# Patient Record
Sex: MALE | Race: WHITE | Employment: STUDENT | URBAN - METROPOLITAN AREA
[De-identification: names, ages, dates, MRNs, and addresses within clinical notes are randomized per-mention and may not be internally consistent; named-entity substitution may affect disease eponyms.]

---
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
## 2022-06-06 ENCOUNTER — OFFICE VISIT (OUTPATIENT)
Dept: SURGERY | Facility: CLINIC | Age: 22
End: 2022-06-06
Payer: COMMERCIAL

## 2022-06-06 VITALS
DIASTOLIC BLOOD PRESSURE: 76 MMHG | SYSTOLIC BLOOD PRESSURE: 125 MMHG | WEIGHT: 200.19 LBS | TEMPERATURE: 98 F | HEART RATE: 87 BPM | BODY MASS INDEX: 30 KG/M2

## 2022-06-06 DIAGNOSIS — M60.20 FOREIGN BODY GRANULOMA OF SOFT TISSUE: Primary | ICD-10-CM

## 2022-06-06 DIAGNOSIS — R10.31 RIGHT LOWER QUADRANT ABDOMINAL PAIN: ICD-10-CM

## 2022-06-13 DIAGNOSIS — L92.9 LIPID GRANULOMA OF SKIN CAUSED BY MINERAL OIL: Primary | ICD-10-CM

## 2022-06-13 DIAGNOSIS — T47.4X5A LIPID GRANULOMA OF SKIN CAUSED BY MINERAL OIL: Primary | ICD-10-CM

## 2022-06-13 DIAGNOSIS — R10.30 LOWER ABDOMINAL PAIN: ICD-10-CM

## 2022-06-23 ENCOUNTER — TELEPHONE (OUTPATIENT)
Facility: LOCATION | Age: 22
End: 2022-06-23

## 2022-07-01 RX ORDER — SODIUM CHLORIDE, SODIUM LACTATE, POTASSIUM CHLORIDE, CALCIUM CHLORIDE 600; 310; 30; 20 MG/100ML; MG/100ML; MG/100ML; MG/100ML
INJECTION, SOLUTION INTRAVENOUS CONTINUOUS
Status: CANCELLED | OUTPATIENT
Start: 2022-07-01

## 2022-07-01 RX ORDER — IBUPROFEN 200 MG
200 TABLET ORAL EVERY 6 HOURS PRN
COMMUNITY

## 2022-07-05 ENCOUNTER — LAB ENCOUNTER (OUTPATIENT)
Dept: LAB | Age: 22
End: 2022-07-05
Attending: COLON & RECTAL SURGERY
Payer: COMMERCIAL

## 2022-07-05 DIAGNOSIS — M60.20 FOREIGN BODY GRANULOMA OF SOFT TISSUE: ICD-10-CM

## 2022-07-06 LAB — SARS-COV-2 RNA RESP QL NAA+PROBE: NOT DETECTED

## 2022-07-08 ENCOUNTER — HOSPITAL ENCOUNTER (OUTPATIENT)
Facility: HOSPITAL | Age: 22
Setting detail: HOSPITAL OUTPATIENT SURGERY
Discharge: HOME | End: 2022-07-08
Attending: COLON & RECTAL SURGERY | Admitting: COLON & RECTAL SURGERY
Payer: COMMERCIAL

## 2022-07-08 ENCOUNTER — ANESTHESIA EVENT (OUTPATIENT)
Dept: SURGERY | Facility: HOSPITAL | Age: 22
End: 2022-07-08
Payer: COMMERCIAL

## 2022-07-08 ENCOUNTER — ANESTHESIA (OUTPATIENT)
Dept: SURGERY | Facility: HOSPITAL | Age: 22
End: 2022-07-08
Payer: COMMERCIAL

## 2022-07-08 VITALS
HEART RATE: 74 BPM | SYSTOLIC BLOOD PRESSURE: 122 MMHG | HEIGHT: 68 IN | DIASTOLIC BLOOD PRESSURE: 82 MMHG | OXYGEN SATURATION: 97 % | WEIGHT: 194 LBS | TEMPERATURE: 97 F | RESPIRATION RATE: 16 BRPM | BODY MASS INDEX: 29.4 KG/M2

## 2022-07-08 DIAGNOSIS — M60.20 FOREIGN BODY GRANULOMA OF SOFT TISSUE: Primary | ICD-10-CM

## 2022-07-08 DIAGNOSIS — R10.30 LOWER ABDOMINAL PAIN: ICD-10-CM

## 2022-07-08 DIAGNOSIS — L92.9 LIPID GRANULOMA OF SKIN CAUSED BY MINERAL OIL: ICD-10-CM

## 2022-07-08 DIAGNOSIS — T47.4X5A LIPID GRANULOMA OF SKIN CAUSED BY MINERAL OIL: ICD-10-CM

## 2022-07-08 PROCEDURE — 88304 TISSUE EXAM BY PATHOLOGIST: CPT | Performed by: COLON & RECTAL SURGERY

## 2022-07-08 PROCEDURE — 0JB80ZZ EXCISION OF ABDOMEN SUBCUTANEOUS TISSUE AND FASCIA, OPEN APPROACH: ICD-10-PCS | Performed by: COLON & RECTAL SURGERY

## 2022-07-08 RX ORDER — MIDAZOLAM HYDROCHLORIDE 1 MG/ML
1 INJECTION INTRAMUSCULAR; INTRAVENOUS EVERY 5 MIN PRN
Status: DISCONTINUED | OUTPATIENT
Start: 2022-07-08 | End: 2022-07-08

## 2022-07-08 RX ORDER — ACETAMINOPHEN 500 MG
1000 TABLET ORAL ONCE
Status: DISCONTINUED | OUTPATIENT
Start: 2022-07-08 | End: 2022-07-08 | Stop reason: HOSPADM

## 2022-07-08 RX ORDER — HYDROMORPHONE HYDROCHLORIDE 1 MG/ML
0.4 INJECTION, SOLUTION INTRAMUSCULAR; INTRAVENOUS; SUBCUTANEOUS EVERY 5 MIN PRN
Status: DISCONTINUED | OUTPATIENT
Start: 2022-07-08 | End: 2022-07-08

## 2022-07-08 RX ORDER — HYDROCODONE BITARTRATE AND ACETAMINOPHEN 5; 325 MG/1; MG/1
1 TABLET ORAL EVERY 6 HOURS PRN
Qty: 8 TABLET | Refills: 0 | Status: SHIPPED | OUTPATIENT
Start: 2022-07-08

## 2022-07-08 RX ORDER — SODIUM CHLORIDE, SODIUM LACTATE, POTASSIUM CHLORIDE, CALCIUM CHLORIDE 600; 310; 30; 20 MG/100ML; MG/100ML; MG/100ML; MG/100ML
INJECTION, SOLUTION INTRAVENOUS CONTINUOUS
Status: DISCONTINUED | OUTPATIENT
Start: 2022-07-08 | End: 2022-07-08

## 2022-07-08 RX ORDER — NALOXONE HYDROCHLORIDE 0.4 MG/ML
80 INJECTION, SOLUTION INTRAMUSCULAR; INTRAVENOUS; SUBCUTANEOUS AS NEEDED
Status: DISCONTINUED | OUTPATIENT
Start: 2022-07-08 | End: 2022-07-08

## 2022-07-08 RX ORDER — PROCHLORPERAZINE EDISYLATE 5 MG/ML
5 INJECTION INTRAMUSCULAR; INTRAVENOUS EVERY 8 HOURS PRN
Status: DISCONTINUED | OUTPATIENT
Start: 2022-07-08 | End: 2022-07-08

## 2022-07-08 RX ORDER — HYDROMORPHONE HYDROCHLORIDE 1 MG/ML
INJECTION, SOLUTION INTRAMUSCULAR; INTRAVENOUS; SUBCUTANEOUS
Status: COMPLETED
Start: 2022-07-08 | End: 2022-07-08

## 2022-07-08 RX ORDER — HYDROCODONE BITARTRATE AND ACETAMINOPHEN 5; 325 MG/1; MG/1
2 TABLET ORAL ONCE AS NEEDED
Status: COMPLETED | OUTPATIENT
Start: 2022-07-08 | End: 2022-07-08

## 2022-07-08 RX ORDER — ACETAMINOPHEN 500 MG
1000 TABLET ORAL ONCE AS NEEDED
Status: COMPLETED | OUTPATIENT
Start: 2022-07-08 | End: 2022-07-08

## 2022-07-08 RX ORDER — LIDOCAINE HYDROCHLORIDE 10 MG/ML
INJECTION, SOLUTION INFILTRATION; PERINEURAL AS NEEDED
Status: DISCONTINUED | OUTPATIENT
Start: 2022-07-08 | End: 2022-07-08 | Stop reason: HOSPADM

## 2022-07-08 RX ORDER — DIPHENHYDRAMINE HYDROCHLORIDE 50 MG/ML
12.5 INJECTION INTRAMUSCULAR; INTRAVENOUS AS NEEDED
Status: DISCONTINUED | OUTPATIENT
Start: 2022-07-08 | End: 2022-07-08

## 2022-07-08 RX ORDER — DEXAMETHASONE SODIUM PHOSPHATE 4 MG/ML
VIAL (ML) INJECTION AS NEEDED
Status: DISCONTINUED | OUTPATIENT
Start: 2022-07-08 | End: 2022-07-08 | Stop reason: SURG

## 2022-07-08 RX ORDER — GLYCOPYRROLATE 0.2 MG/ML
INJECTION, SOLUTION INTRAMUSCULAR; INTRAVENOUS AS NEEDED
Status: DISCONTINUED | OUTPATIENT
Start: 2022-07-08 | End: 2022-07-08 | Stop reason: SURG

## 2022-07-08 RX ORDER — MEPERIDINE HYDROCHLORIDE 25 MG/ML
12.5 INJECTION INTRAMUSCULAR; INTRAVENOUS; SUBCUTANEOUS AS NEEDED
Status: DISCONTINUED | OUTPATIENT
Start: 2022-07-08 | End: 2022-07-08

## 2022-07-08 RX ORDER — BUPIVACAINE HYDROCHLORIDE AND EPINEPHRINE 5; 5 MG/ML; UG/ML
INJECTION, SOLUTION EPIDURAL; INTRACAUDAL; PERINEURAL AS NEEDED
Status: DISCONTINUED | OUTPATIENT
Start: 2022-07-08 | End: 2022-07-08 | Stop reason: HOSPADM

## 2022-07-08 RX ORDER — SCOLOPAMINE TRANSDERMAL SYSTEM 1 MG/1
1 PATCH, EXTENDED RELEASE TRANSDERMAL ONCE
Status: DISCONTINUED | OUTPATIENT
Start: 2022-07-08 | End: 2022-07-08 | Stop reason: HOSPADM

## 2022-07-08 RX ORDER — HYDROMORPHONE HYDROCHLORIDE 1 MG/ML
0.2 INJECTION, SOLUTION INTRAMUSCULAR; INTRAVENOUS; SUBCUTANEOUS EVERY 5 MIN PRN
Status: DISCONTINUED | OUTPATIENT
Start: 2022-07-08 | End: 2022-07-08

## 2022-07-08 RX ORDER — NEOSTIGMINE METHYLSULFATE 1 MG/ML
INJECTION, SOLUTION INTRAVENOUS AS NEEDED
Status: DISCONTINUED | OUTPATIENT
Start: 2022-07-08 | End: 2022-07-08 | Stop reason: SURG

## 2022-07-08 RX ORDER — LIDOCAINE HYDROCHLORIDE 40 MG/ML
INJECTION, SOLUTION RETROBULBAR; TOPICAL AS NEEDED
Status: DISCONTINUED | OUTPATIENT
Start: 2022-07-08 | End: 2022-07-08 | Stop reason: SURG

## 2022-07-08 RX ORDER — LIDOCAINE HYDROCHLORIDE 10 MG/ML
INJECTION, SOLUTION EPIDURAL; INFILTRATION; INTRACAUDAL; PERINEURAL AS NEEDED
Status: DISCONTINUED | OUTPATIENT
Start: 2022-07-08 | End: 2022-07-08 | Stop reason: SURG

## 2022-07-08 RX ORDER — HYDROMORPHONE HYDROCHLORIDE 1 MG/ML
0.6 INJECTION, SOLUTION INTRAMUSCULAR; INTRAVENOUS; SUBCUTANEOUS EVERY 5 MIN PRN
Status: DISCONTINUED | OUTPATIENT
Start: 2022-07-08 | End: 2022-07-08

## 2022-07-08 RX ORDER — HYDROCODONE BITARTRATE AND ACETAMINOPHEN 5; 325 MG/1; MG/1
1 TABLET ORAL ONCE AS NEEDED
Status: COMPLETED | OUTPATIENT
Start: 2022-07-08 | End: 2022-07-08

## 2022-07-08 RX ORDER — HEPARIN SODIUM 5000 [USP'U]/ML
5000 INJECTION, SOLUTION INTRAVENOUS; SUBCUTANEOUS ONCE
Status: COMPLETED | OUTPATIENT
Start: 2022-07-08 | End: 2022-07-08

## 2022-07-08 RX ORDER — KETOROLAC TROMETHAMINE 30 MG/ML
INJECTION, SOLUTION INTRAMUSCULAR; INTRAVENOUS AS NEEDED
Status: DISCONTINUED | OUTPATIENT
Start: 2022-07-08 | End: 2022-07-08 | Stop reason: SURG

## 2022-07-08 RX ORDER — ROCURONIUM BROMIDE 10 MG/ML
INJECTION, SOLUTION INTRAVENOUS AS NEEDED
Status: DISCONTINUED | OUTPATIENT
Start: 2022-07-08 | End: 2022-07-08 | Stop reason: SURG

## 2022-07-08 RX ORDER — CEFAZOLIN SODIUM/WATER 2 G/20 ML
2 SYRINGE (ML) INTRAVENOUS ONCE
Status: COMPLETED | OUTPATIENT
Start: 2022-07-08 | End: 2022-07-08

## 2022-07-08 RX ORDER — KETAMINE HYDROCHLORIDE 50 MG/ML
INJECTION, SOLUTION, CONCENTRATE INTRAMUSCULAR; INTRAVENOUS AS NEEDED
Status: DISCONTINUED | OUTPATIENT
Start: 2022-07-08 | End: 2022-07-08 | Stop reason: SURG

## 2022-07-08 RX ORDER — ONDANSETRON 2 MG/ML
4 INJECTION INTRAMUSCULAR; INTRAVENOUS EVERY 6 HOURS PRN
Status: DISCONTINUED | OUTPATIENT
Start: 2022-07-08 | End: 2022-07-08

## 2022-07-08 RX ORDER — ONDANSETRON 2 MG/ML
INJECTION INTRAMUSCULAR; INTRAVENOUS AS NEEDED
Status: DISCONTINUED | OUTPATIENT
Start: 2022-07-08 | End: 2022-07-08 | Stop reason: SURG

## 2022-07-08 RX ADMIN — ONDANSETRON 4 MG: 2 INJECTION INTRAMUSCULAR; INTRAVENOUS at 11:32:00

## 2022-07-08 RX ADMIN — ROCURONIUM BROMIDE 30 MG: 10 INJECTION, SOLUTION INTRAVENOUS at 11:23:00

## 2022-07-08 RX ADMIN — LIDOCAINE HYDROCHLORIDE 4 ML: 40 INJECTION, SOLUTION RETROBULBAR; TOPICAL at 11:25:00

## 2022-07-08 RX ADMIN — KETOROLAC TROMETHAMINE 30 MG: 30 INJECTION, SOLUTION INTRAMUSCULAR; INTRAVENOUS at 12:06:00

## 2022-07-08 RX ADMIN — SODIUM CHLORIDE, SODIUM LACTATE, POTASSIUM CHLORIDE, CALCIUM CHLORIDE: 600; 310; 30; 20 INJECTION, SOLUTION INTRAVENOUS at 12:09:00

## 2022-07-08 RX ADMIN — DEXAMETHASONE SODIUM PHOSPHATE 8 MG: 4 MG/ML VIAL (ML) INJECTION at 11:27:00

## 2022-07-08 RX ADMIN — LIDOCAINE HYDROCHLORIDE 100 MG: 10 INJECTION, SOLUTION EPIDURAL; INFILTRATION; INTRACAUDAL; PERINEURAL at 11:23:00

## 2022-07-08 RX ADMIN — CEFAZOLIN SODIUM/WATER 2 G: 2 G/20 ML SYRINGE (ML) INTRAVENOUS at 11:28:00

## 2022-07-08 RX ADMIN — NEOSTIGMINE METHYLSULFATE 4 MG: 1 INJECTION, SOLUTION INTRAVENOUS at 12:06:00

## 2022-07-08 RX ADMIN — KETAMINE HYDROCHLORIDE 10 MG: 50 INJECTION, SOLUTION, CONCENTRATE INTRAMUSCULAR; INTRAVENOUS at 11:28:00

## 2022-07-08 RX ADMIN — GLYCOPYRROLATE 0.8 MG: 0.2 INJECTION, SOLUTION INTRAMUSCULAR; INTRAVENOUS at 12:06:00

## 2022-07-08 RX ADMIN — SODIUM CHLORIDE, SODIUM LACTATE, POTASSIUM CHLORIDE, CALCIUM CHLORIDE: 600; 310; 30; 20 INJECTION, SOLUTION INTRAVENOUS at 11:17:00

## 2022-07-08 NOTE — ANESTHESIA PROCEDURE NOTES
Airway  Date/Time: 7/8/2022 11:26 AM  Urgency: elective      General Information and Staff    Patient location during procedure: OR  Anesthesiologist: Nelson Beal MD  Resident/CRNA: Satya Dean CRNA  Performed: CRNA     Indications and Patient Condition  Indications for airway management: anesthesia  Sedation level: deep  Preoxygenated: yes  Patient position: sniffing  Mask difficulty assessment: 1 - vent by mask    Final Airway Details  Final airway type: endotracheal airway      Successful airway: ETT  Cuffed: yes   Successful intubation technique: direct laryngoscopy  Endotracheal tube insertion site: oral  Blade: Nguyễn  Blade size: #4  ETT size (mm): 7.5    Cormack-Lehane Classification: grade I - full view of glottis  Placement verified by: chest auscultation and capnometry   Measured from: lips  ETT to lips (cm): 23  Number of attempts at approach: 1

## 2022-07-08 NOTE — OPERATIVE REPORT
[facility]  Operative Note    [facility] Location: OR   [facility] [id number] MRN [id number]   Admission Date 7/8/2022 Operation Date 7/8/2022   Attending Physician Lavinia Dalton MD Operating Physician Liang Abbasi MD     Pre-Operative Diagnosis: foreign body granuloma of lower abdomen    Post-Operative Diagnosis: Same as above    Procedure Performed: Excision of foreign body granuloma right lower quadrant just to the right of the midline, full-thickness down to fascia    Surgeon(s) and Role:     Gurvinder Blackwell MD - Primary  Assistant: Tejinder Boyd PA-C    Anesthesia: General    History of Present Illness: This patient has had a hernia repair. We already have remove one tack from his abdominal wall. 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 There is a point of tenderness that is constantly bothering him. The patient and I preoperatively marked the site. He presents at this time for removal of foreign body granuloma. Operative findings:      Foreign body granuloma actually was a folded piece of mesh that was protruding up into the rectus muscle. I was able to identify it immediately beneath the clark that was placed by me in the patient. We cut off that corner of the fold in the mesh. I repaired the defect in 2 layers with a deep layer of #1 Vicryl, superficial layer of running #1 Ethibond suture. Operative Summary:    Following adequate general anesthesia the patient was placed in the supine position on the operating room table. The abdomen was scrubbed with chlorhexidine, sterile drapes were placed with wide exposure of the abdomen from xiphoid to pubis. The patient and I have preoperatively marked the site of his symptoms. Timeout was performed confirming the patient's identifying data, the administration of antibiotics, and the procedure to be performed. I made an incision directly over the site in a transverse fashion.   I was able to palpate through the skin the area of the granuloma. Skin and subcutaneous tissues were dissected down to the anterior fascia. Immediately beneath the anterior fascia, within the rectus muscle, a folded piece of mesh was identified. It was removed with surrounding tissue that was chronically involved with granulomatous tissue. It was sent to pathology for further histopathologic diagnosis. I then closed the defect with a deep layer of #1 Vicryl to the posterior rectus fascia. Anterior fascia was repaired with a running #1 Ethibond suture in a simple continuous fashion. The total defect was approximately 2 cm. The skin was then closed in layers with a deep layer of interrupted 3-0 Vicryl. The top layer was 5-0 undyed Vicryl in a subcuticular fashion. Marcaine 0.5% with epinephrine was injected into the wound margins. Steri-Strips were placed over benzoin adhesive. Sterile dressings were placed. The patient was delivered to recovery in stable postoperative condition.             EBL: 3 cc    Pathologic Specimen: Foreign body granuloma within the muscle and fascia of the right lower quadrant just lateral to the midline    Drains: None    Marquita Mattson MD  7/8/2022  1:00 PM

## 2022-07-08 NOTE — ANESTHESIA POSTPROCEDURE EVALUATION
[address] Patient Status:  Hospital Outpatient Surgery   Age/Gender 20year old male MRN [id number]   Location [address] [facility] Attending Regla Rodrigues MD   Hosp Day # 0 PCP No primary care provider on file. Anesthesia Post-op Note    EXCISION OF FOREIGN BODY GRANULOMA MIDLINE ABDOMEN    Procedure Summary     Date: 07/08/22 Room / Location: [facility] MAIN OR [address] [place] [address] MAIN OR    Anesthesia Start: 4579 Anesthesia Stop: 1207    Procedure: EXCISION OF FOREIGN BODY GRANULOMA MIDLINE ABDOMEN (N/A Abdomen) Diagnosis:       Lipid granuloma of skin caused by mineral oil      Lower abdominal pain      (same as pre-op)    Surgeons: Regla Rodrigues MD Anesthesiologist: Casper Amaya MD    Anesthesia Type: general ASA Status: 1          Anesthesia Type: general    Vitals Value Taken Time   /77 07/08/22 1229   Temp 97.9 07/08/22 1231   Pulse 61 07/08/22 1230   Resp 20 07/08/22 1230   SpO2 98 % 07/08/22 1230   Vitals shown include unvalidated device data. Patient Location: PACU    Anesthesia Type: general    Airway Patency: patent    Postop Pain Control: adequate    Mental Status: mildly sedated but able to meaningfully participate in the post-anesthesia evaluation    Nausea/Vomiting: none    Cardiopulmonary/Hydration status: stable euvolemic    Complications: no apparent anesthesia related complications    Postop vital signs: stable    Dental Exam: Unchanged from Preop    Patient to be discharged from PACU when criteria met.

## 2022-07-11 ENCOUNTER — OFFICE VISIT (OUTPATIENT)
Facility: LOCATION | Age: 22
End: 2022-07-11

## 2022-07-11 ENCOUNTER — TELEPHONE (OUTPATIENT)
Facility: LOCATION | Age: 22
End: 2022-07-11

## 2022-07-11 VITALS — SYSTOLIC BLOOD PRESSURE: 122 MMHG | TEMPERATURE: 97 F | DIASTOLIC BLOOD PRESSURE: 82 MMHG | HEART RATE: 74 BPM

## 2022-07-11 DIAGNOSIS — Z98.890 POST-OPERATIVE STATE: Primary | ICD-10-CM

## 2022-07-18 ENCOUNTER — OFFICE VISIT (OUTPATIENT)
Facility: LOCATION | Age: 22
End: 2022-07-18

## 2022-07-18 VITALS — BODY MASS INDEX: 29.4 KG/M2 | WEIGHT: 194 LBS | TEMPERATURE: 97 F | HEIGHT: 68 IN

## 2022-07-18 DIAGNOSIS — Z98.890 POST-OPERATIVE STATE: Primary | ICD-10-CM

## 2022-07-18 DIAGNOSIS — M60.20 FOREIGN BODY GRANULOMA OF SOFT TISSUE: ICD-10-CM

## 2022-07-18 NOTE — PATIENT INSTRUCTIONS
The patient presents for continued care and evaluation following an excision of a suture granuloma with layered closure on 7/8/2022. The patient states he has been doing well postoperatively. He states he has minimal pain at the incision site. He initially saw Lisa Carrion PA-C on 7/11/2022 due to fear of a possible postop infection. She did not see evidence of a wound infection at that time. The patient denies any drainage from the incision site. He denies surrounding erythema or cellulitis. He denies fevers, chills, nausea or vomiting. Clinical exam of the patient's wound reveals an approximately 5 cm transverse incision in the right lower quadrant. I took the opportunity at today's visit to remove his Steri-Strips. The incision is clean, dry, intact without surrounding erythema or cellulitis. Overall the patient is doing well following excision of a suture granuloma with layered closure on 7/8/2022. I instructed the patient to place Neosporin over the wound 3 times daily to aid in wound healing. He should refrain from submerging his incision in a pool, bathtub or hot tub for a total of 2 weeks postoperatively. I instructed the patient to refrain from lifting, twisting, pulling, pushing items greater than 25 pounds for a total of 6 weeks postoperatively. I have no further follow-up scheduled with this patient at this time. This patient can see me or Dr. Maude Hopson on an as-needed basis. This patient should return urgently for any problems or complications related to the surgical intervention.

## 2023-01-02 ENCOUNTER — TELEPHONE (OUTPATIENT)
Facility: LOCATION | Age: 23
End: 2023-01-02

## 2023-01-02 NOTE — TELEPHONE ENCOUNTER
Called pt to schedule appt with Dr. Taya Lucia. Offered appts and pt is unable to make d/t living in [place]. Advised he see his dr in [place].   Pt verbalized understanding

## 2023-01-02 NOTE — TELEPHONE ENCOUNTER
Pt called because he is experiencing 4/10 pain near belt-line, a little above the last incision he had. Denies fever and bleeding.  Will make an appt to be seen by the dr as well  Please advise  Best callback number is [phone]

## 2023-02-09 ENCOUNTER — APPOINTMENT (OUTPATIENT)
Dept: CT IMAGING | Facility: CLINIC | Age: 23
End: 2023-02-09
Attending: EMERGENCY MEDICINE
Payer: COMMERCIAL

## 2023-02-09 ENCOUNTER — HOSPITAL ENCOUNTER (EMERGENCY)
Facility: CLINIC | Age: 23
Discharge: HOME | End: 2023-02-09
Attending: EMERGENCY MEDICINE | Admitting: EMERGENCY MEDICINE
Payer: COMMERCIAL

## 2023-02-09 VITALS
OXYGEN SATURATION: 97 % | HEART RATE: 65 BPM | RESPIRATION RATE: 16 BRPM | WEIGHT: 200 LBS | SYSTOLIC BLOOD PRESSURE: 123 MMHG | TEMPERATURE: 98.3 F | DIASTOLIC BLOOD PRESSURE: 65 MMHG

## 2023-02-09 DIAGNOSIS — R10.30 LOWER ABDOMINAL PAIN: ICD-10-CM

## 2023-02-09 DIAGNOSIS — R17 ELEVATED BILIRUBIN: ICD-10-CM

## 2023-02-09 DIAGNOSIS — R30.0 DYSURIA: ICD-10-CM

## 2023-02-09 LAB
ALBUMIN SERPL-MCNC: 4.6 G/DL (ref 3.5–5)
ALBUMIN UR-MCNC: 30 MG/DL
ALP SERPL-CCNC: 50 U/L (ref 45–120)
ALT SERPL W P-5'-P-CCNC: 24 U/L (ref 0–45)
ANION GAP SERPL CALCULATED.3IONS-SCNC: 11 MMOL/L (ref 5–18)
APPEARANCE UR: CLEAR
AST SERPL W P-5'-P-CCNC: 31 U/L (ref 0–40)
BASOPHILS # BLD AUTO: 0 10E3/UL (ref 0–0.2)
BASOPHILS NFR BLD AUTO: 0 %
BILIRUB DIRECT SERPL-MCNC: 0.4 MG/DL
BILIRUB SERPL-MCNC: 4.5 MG/DL (ref 0–1)
BILIRUB UR QL STRIP: NEGATIVE
BUN SERPL-MCNC: 11 MG/DL (ref 8–22)
CALCIUM SERPL-MCNC: 9.1 MG/DL (ref 8.5–10.5)
CHLORIDE BLD-SCNC: 103 MMOL/L (ref 98–107)
CO2 SERPL-SCNC: 22 MMOL/L (ref 22–31)
COLOR UR AUTO: YELLOW
CREAT SERPL-MCNC: 0.85 MG/DL (ref 0.7–1.3)
EOSINOPHIL # BLD AUTO: 0.2 10E3/UL (ref 0–0.7)
EOSINOPHIL NFR BLD AUTO: 2 %
ERYTHROCYTE [DISTWIDTH] IN BLOOD BY AUTOMATED COUNT: 12.3 % (ref 10–15)
GFR SERPL CREATININE-BSD FRML MDRD: >90 ML/MIN/1.73M2
GLUCOSE BLD-MCNC: 122 MG/DL (ref 70–125)
GLUCOSE UR STRIP-MCNC: NEGATIVE MG/DL
HCT VFR BLD AUTO: 42.6 % (ref 40–53)
HGB BLD-MCNC: 14.9 G/DL (ref 13.3–17.7)
HGB UR QL STRIP: NEGATIVE
HYALINE CASTS: 1 /LPF
IMM GRANULOCYTES # BLD: 0 10E3/UL
IMM GRANULOCYTES NFR BLD: 0 %
KETONES UR STRIP-MCNC: 60 MG/DL
LEUKOCYTE ESTERASE UR QL STRIP: NEGATIVE
LYMPHOCYTES # BLD AUTO: 2.3 10E3/UL (ref 0.8–5.3)
LYMPHOCYTES NFR BLD AUTO: 25 %
MCH RBC QN AUTO: 29.2 PG (ref 26.5–33)
MCHC RBC AUTO-ENTMCNC: 35 G/DL (ref 31.5–36.5)
MCV RBC AUTO: 84 FL (ref 78–100)
MONOCYTES # BLD AUTO: 0.8 10E3/UL (ref 0–1.3)
MONOCYTES NFR BLD AUTO: 9 %
MUCOUS THREADS #/AREA URNS LPF: PRESENT /LPF
NEUTROPHILS # BLD AUTO: 5.9 10E3/UL (ref 1.6–8.3)
NEUTROPHILS NFR BLD AUTO: 64 %
NITRATE UR QL: NEGATIVE
NRBC # BLD AUTO: 0 10E3/UL
NRBC BLD AUTO-RTO: 0 /100
PH UR STRIP: 5.5 [PH] (ref 5–7)
PLATELET # BLD AUTO: 238 10E3/UL (ref 150–450)
POTASSIUM BLD-SCNC: 3.4 MMOL/L (ref 3.5–5)
PROT SERPL-MCNC: 7.2 G/DL (ref 6–8)
RBC # BLD AUTO: 5.1 10E6/UL (ref 4.4–5.9)
RBC URINE: 0 /HPF
SODIUM SERPL-SCNC: 136 MMOL/L (ref 136–145)
SP GR UR STRIP: 1.03 (ref 1–1.03)
UROBILINOGEN UR STRIP-MCNC: <2 MG/DL
WBC # BLD AUTO: 9.2 10E3/UL (ref 4–11)
WBC URINE: 1 /HPF

## 2023-02-09 PROCEDURE — 80053 COMPREHEN METABOLIC PANEL: CPT | Performed by: EMERGENCY MEDICINE

## 2023-02-09 PROCEDURE — 99285 EMERGENCY DEPT VISIT HI MDM: CPT | Mod: 25

## 2023-02-09 PROCEDURE — 87591 N.GONORRHOEAE DNA AMP PROB: CPT | Performed by: EMERGENCY MEDICINE

## 2023-02-09 PROCEDURE — 81003 URINALYSIS AUTO W/O SCOPE: CPT | Performed by: EMERGENCY MEDICINE

## 2023-02-09 PROCEDURE — 87491 CHLMYD TRACH DNA AMP PROBE: CPT | Performed by: EMERGENCY MEDICINE

## 2023-02-09 PROCEDURE — 74177 CT ABD & PELVIS W/CONTRAST: CPT

## 2023-02-09 PROCEDURE — 85025 COMPLETE CBC W/AUTO DIFF WBC: CPT | Performed by: EMERGENCY MEDICINE

## 2023-02-09 PROCEDURE — 250N000011 HC RX IP 250 OP 636: Performed by: EMERGENCY MEDICINE

## 2023-02-09 PROCEDURE — 36415 COLL VENOUS BLD VENIPUNCTURE: CPT | Performed by: EMERGENCY MEDICINE

## 2023-02-09 PROCEDURE — 82248 BILIRUBIN DIRECT: CPT | Performed by: EMERGENCY MEDICINE

## 2023-02-09 RX ORDER — CEPHALEXIN 500 MG/1
500 CAPSULE ORAL 2 TIMES DAILY
Qty: 28 CAPSULE | Refills: 0 | Status: SHIPPED | OUTPATIENT
Start: 2023-02-09 | End: 2023-02-16

## 2023-02-09 RX ORDER — IOPAMIDOL 755 MG/ML
100 INJECTION, SOLUTION INTRAVASCULAR ONCE
Status: COMPLETED | OUTPATIENT
Start: 2023-02-09 | End: 2023-02-09

## 2023-02-09 RX ADMIN — IOPAMIDOL 100 ML: 755 INJECTION, SOLUTION INTRAVENOUS at 20:00

## 2023-02-10 ENCOUNTER — OFFICE VISIT (OUTPATIENT)
Dept: FAMILY MEDICINE | Facility: CLINIC | Age: 23
End: 2023-02-10
Attending: EMERGENCY MEDICINE
Payer: COMMERCIAL

## 2023-02-10 VITALS
DIASTOLIC BLOOD PRESSURE: 86 MMHG | HEART RATE: 68 BPM | WEIGHT: 195.44 LBS | SYSTOLIC BLOOD PRESSURE: 112 MMHG | OXYGEN SATURATION: 97 %

## 2023-02-10 DIAGNOSIS — Z76.89 ENCOUNTER TO ESTABLISH CARE: Primary | ICD-10-CM

## 2023-02-10 DIAGNOSIS — R17 ELEVATED BILIRUBIN: ICD-10-CM

## 2023-02-10 LAB
C TRACH DNA SPEC QL NAA+PROBE: NEGATIVE
N GONORRHOEA DNA SPEC QL NAA+PROBE: NEGATIVE

## 2023-02-10 PROCEDURE — 99204 OFFICE O/P NEW MOD 45 MIN: CPT | Performed by: PHYSICIAN ASSISTANT

## 2023-02-10 NOTE — DISCHARGE INSTRUCTIONS
You need to follow up with a Primary Care for repeat blood testing to ensure your bilirubin returns to normal.

## 2023-02-10 NOTE — ED TRIAGE NOTES
Pt has a history of hernia repair and mesh revisions.  Has pain right lower quad.  But urine is also cloudy and burns when urinating.     Triage Assessment     Row Name 02/09/23 1268       Triage Assessment (Adult)    Airway WDL WDL       Respiratory WDL    Respiratory WDL WDL       Skin Circulation/Temperature WDL    Skin Circulation/Temperature WDL WDL       Cardiac WDL    Cardiac WDL WDL       Peripheral/Neurovascular WDL    Peripheral Neurovascular WDL WDL       Cognitive/Neuro/Behavioral WDL    Cognitive/Neuro/Behavioral WDL WDL

## 2023-02-10 NOTE — ED PROVIDER NOTES
EMERGENCY DEPARTMENT ENCOUNTER      NAME: Graeme Christian  AGE: 22 year old male  YOB: 2000  MRN: [id number]  EVALUATION DATE & TIME: No admission date for patient encounter.    PCP: No primary care provider on file.    ED PROVIDER: Robbie Ronquillo D.O.      Chief Complaint   Patient presents with     Abdominal Pain     Dysuria       FINAL IMPRESSION:  1. Dysuria    2. Elevated bilirubin    3. Lower abdominal pain        ED COURSE & MEDICAL DECISION MAKIN:00 PM I met with the patient to gather history and to perform my initial exam. I discussed the plan for care while in the Emergency Department.  8:39 PM I updated patient with results and plan for care.  8:44 PM I spoke to Dr Mcnally with [facility] regarding patient. They recommend follow up with PCP.  8:53 PM I updated patient with plan for discharge.         Pertinent Labs & Imaging studies reviewed. (See chart for details)  22 year old male presents to the Emergency Department for evaluation of lower abdominal pain and dysuria.  Initial concern for UTI versus STI versus appendicitis versus bowel obstruction versus pyelonephritis.  UA was largely unremarkable, however with his symptoms, and his insistence that he has no significant risk factors for STIs as he always uses protection, uncertain the underlying cause of the dysuria, but do plan is still discharged on antibiotics.  CT scan imaging does not show any evidence of hernia, obstruction, appendicitis, or other inflammatory process of the abdomen.  Patient has no scrotal or testicular pain, therefore I did not believe ultrasound was indicated of the scrotum.  Of note on his labs he did have an elevated bilirubin but otherwise was largely unremarkable.  Because of this elevated bilirubin, I did consult with gastroenterology, and their recommendation was follow-up with primary care as this likely represents a benign process.  I did not feel that he needed to be admitted or needed to be seen by them  in the clinic.  Patient was agreeable with this plan.  Return precautions were discussed.    Medical Decision Making    History:    Supplemental history from: Documented in chart, if applicable    External Record(s) reviewed: Documented in chart, if applicable.    Work Up:    Chart documentation includes differential considered and any EKGs or imaging independently interpreted by provider, where specified.    In additional to work up documented, I considered the following work up: Documented in chart, if applicable.    External consultation:    Discussion of management with another provider: Documented in chart, if applicable    Complicating factors:    Care impacted by chronic illness: N/A    Care affected by social determinants of health: N/A    Disposition considerations: Discharge. I prescribed additional prescription strength medication(s) as charted. N/A.        At the conclusion of the encounter I discussed the results of all of the tests and the disposition. The questions were answered. The patient or family acknowledged understanding and was agreeable with the care plan.      HPI    Patient information was obtained from: Patient    Use of : N/A       Graeme Christian is a 22 year old male who presents via walk-in for evaluation of dysuria, abdominal pain.    Patient endorses burning pain with urination and cloudy urine, as well as bilateral groin pain which is ongoing without pain in his scrotum. He also endorses non radiating RLQ abdominal pain which he feels may be due to prior hernia issues at that location, noting that he has had 4 previous surgeries for this issue and has a mesh implanted. Patient still has his appendix. He reports that he is sexually active but uses protection.    Patient denies nausea, vomiting, diarrhea, penile discharge, fever, cough, congestion, sore throat, and all other complaints at this time.      REVIEW OF SYSTEMS  Constitutional:  Denies fever, chills, weight loss or  weakness  Eyes:  No pain, discharge, redness  HENT:  Denies sore throat, ear pain, congestion  Respiratory: No SOB, wheeze or cough  Cardiovascular:  No CP, palpitations  GI:  Denies nausea, vomiting, diarrhea. Positive for RLQ abdominal pain.  : Denies hematuria. Positive for dysuria, bilateral groin pain.  Musculoskeletal:  Denies any new muscle/joint pain, swelling or loss of function.  Skin:  Denies rash, pallor  Neurologic:  Denies headache, focal weakness or sensory changes  Lymph: Denies swollen nodes    All other systems negative unless noted in HPI.    PAST MEDICAL HISTORY:  History reviewed. No pertinent past medical history.    PAST SURGICAL HISTORY:  History reviewed. No pertinent surgical history.      CURRENT MEDICATIONS:    No current facility-administered medications for this encounter.     Current Outpatient Medications   Medication     cephALEXin (KEFLEX) 500 MG capsule         ALLERGIES:  No Known Allergies    FAMILY HISTORY:  History reviewed. No pertinent family history.    SOCIAL HISTORY:       VITALS:  Patient Vitals for the past 24 hrs:   BP Temp Temp src Pulse Resp SpO2 Weight   02/09/23 2046 123/65 -- -- 65 -- 97 % --   02/09/23 1854 (!) 163/86 98.3  F (36.8  C) Oral 104 16 100 % 90.7 kg (200 lb)       PHYSICAL EXAM    VITAL SIGNS: /65   Pulse 65   Temp 98.3  F (36.8  C) (Oral)   Resp 16   Wt 90.7 kg (200 lb)   SpO2 97%     General Appearance: Well-appearing, well-nourished, no acute distress  Head:  Normocephalic, without obvious abnormality, atraumatic  Eyes:  PERRL, conjunctiva/corneas clear, EOM's intact,  ENT:  Lips, mucosa, and tongue normal, membranes are moist without pallor  Neck:  Normal ROM, symmetrical, trachea midline    Cardio:  Regular rate and rhythm, no murmur, rub or gallop, 2+ pulses symmetric in all extremities  Pulm:  Clear to auscultation bilaterally, respirations unlabored,  Abdomen:  Soft, no rebound or guarding. RLQ tenderness.  Musculoskeletal: Full  ROM, no edema, no cyanosis, good ROM of major joints  Integument:  Warm, Dry, No erythema, No rash.    Neurologic:  Alert & oriented.  No focal deficits appreciated.  Ambulatory.  Psychiatric:  Affect normal, Judgment normal, Mood normal.      LABS  Results for orders placed or performed during the hospital encounter of 02/09/23 (from the past 24 hour(s))   CBC with platelets + differential    Narrative    The following orders were created for panel order CBC with platelets + differential.  Procedure                               Abnormality         Status                     ---------                               -----------         ------                     CBC with platelets and d...[[id number]]                      Final result                 Please view results for these tests on the individual orders.   Basic metabolic panel   Result Value Ref Range    Sodium 136 136 - 145 mmol/L    Potassium 3.4 (L) 3.5 - 5.0 mmol/L    Chloride 103 98 - 107 mmol/L    Carbon Dioxide (CO2) 22 22 - 31 mmol/L    Anion Gap 11 5 - 18 mmol/L    Urea Nitrogen 11 8 - 22 mg/dL    Creatinine 0.85 0.70 - 1.30 mg/dL    Calcium 9.1 8.5 - 10.5 mg/dL    Glucose 122 70 - 125 mg/dL    GFR Estimate >90 >60 mL/min/1.73m2   Hepatic function panel   Result Value Ref Range    Bilirubin Total 4.5 (H) 0.0 - 1.0 mg/dL    Bilirubin Direct 0.4 <=0.5 mg/dL    Protein Total 7.2 6.0 - 8.0 g/dL    Albumin 4.6 3.5 - 5.0 g/dL    Alkaline Phosphatase 50 45 - 120 U/L    AST 31 0 - 40 U/L    ALT 24 0 - 45 U/L   CBC with platelets and differential   Result Value Ref Range    WBC Count 9.2 4.0 - 11.0 10e3/uL    RBC Count 5.10 4.40 - 5.90 10e6/uL    Hemoglobin 14.9 13.3 - 17.7 g/dL    Hematocrit 42.6 40.0 - 53.0 %    MCV 84 78 - 100 fL    MCH 29.2 26.5 - 33.0 pg    MCHC 35.0 31.5 - 36.5 g/dL    RDW 12.3 10.0 - 15.0 %    Platelet Count 238 150 - 450 10e3/uL    % Neutrophils 64 %    % Lymphocytes 25 %    % Monocytes 9 %    % Eosinophils 2 %    % Basophils 0 %    %  Immature Granulocytes 0 %    NRBCs per 100 WBC 0 <1 /100    Absolute Neutrophils 5.9 1.6 - 8.3 10e3/uL    Absolute Lymphocytes 2.3 0.8 - 5.3 10e3/uL    Absolute Monocytes 0.8 0.0 - 1.3 10e3/uL    Absolute Eosinophils 0.2 0.0 - 0.7 10e3/uL    Absolute Basophils 0.0 0.0 - 0.2 10e3/uL    Absolute Immature Granulocytes 0.0 <=0.4 10e3/uL    Absolute NRBCs 0.0 10e3/uL   UA with Microscopic reflex to Culture    Specimen: Urine, Midstream   Result Value Ref Range    Color Urine Yellow Colorless, Straw, Light Yellow, Yellow    Appearance Urine Clear Clear    Glucose Urine Negative Negative mg/dL    Bilirubin Urine Negative Negative    Ketones Urine 60 (A) Negative mg/dL    Specific Gravity Urine 1.034 (H) 1.001 - 1.030    Blood Urine Negative Negative    pH Urine 5.5 5.0 - 7.0    Protein Albumin Urine 30 (A) Negative mg/dL    Urobilinogen Urine <2.0 <2.0 mg/dL    Nitrite Urine Negative Negative    Leukocyte Esterase Urine Negative Negative    Mucus Urine Present (A) None Seen /LPF    RBC Urine 0 <=2 /HPF    WBC Urine 1 <=5 /HPF    Hyaline Casts Urine 1 <=2 /LPF    Narrative    Urine Culture not indicated   CT Abdomen Pelvis w Contrast    Narrative    EXAM: CT ABDOMEN PELVIS W CONTRAST  LOCATION: [facility]  DATE/TIME: 2/9/2023 8:10 PM    INDICATION: RLQ abdominal pain.  COMPARISON: None.  TECHNIQUE: CT scan of the abdomen and pelvis was performed following injection of IV contrast. Multiplanar reformats were obtained. Dose reduction techniques were used.  CONTRAST: ISOVUE 370 100 mL.    FINDINGS:   LOWER CHEST: Normal.    HEPATOBILIARY: Normal.    PANCREAS: Normal.    SPLEEN: Normal.    ADRENAL GLANDS: Normal.    KIDNEYS/BLADDER: Normal.    BOWEL: Short-segment intussusception left lower quadrant small bowel (axial series 3, images 122-130; coronal series image 40). Remaining bowel unremarkable. No obstruction or inflammation. Normal appendix.    LYMPH NODES: No adenopathy.    VASCULATURE:  Nonaneurysmal aorta.    PELVIC ORGANS: Unremarkable.    MUSCULOSKELETAL: Surgical changes and mesh along the right lower quadrant and right inguinal region. Small fat-containing periumbilical hernia.      Impression    IMPRESSION:     1.  Short-segment left lower quadrant small bowel intussusception. This is typically an incidental and transient finding. No obvious mass.    2.  Remaining bowel unremarkable. No obstruction or inflammation. No appendicitis.    3.  No free fluid or air. No abscess.             RADIOLOGY  CT Abdomen Pelvis w Contrast   Final Result   IMPRESSION:       1.  Short-segment left lower quadrant small bowel intussusception. This is typically an incidental and transient finding. No obvious mass.      2.  Remaining bowel unremarkable. No obstruction or inflammation. No appendicitis.      3.  No free fluid or air. No abscess.                   MEDICATIONS GIVEN IN THE EMERGENCY:  Medications   iopamidol (ISOVUE-370) solution 100 mL (100 mLs Intravenous Given 2/9/23 2000)       NEW PRESCRIPTIONS STARTED AT TODAY'S ER VISIT  Discharge Medication List as of 2/9/2023  8:50 PM      START taking these medications    Details   cephALEXin (KEFLEX) 500 MG capsule Take 1 capsule (500 mg) by mouth 2 times daily for 7 days, Disp-28 capsule, R-0, Local Print              I, Holden Rose, am serving as a scribe to document services personally performed by Robbie Ronquillo D.O., based on my observations and the provider's statements to me.  I, Robbie Ronquillo D.O., attest that Holden Rose is acting in a scribe capacity, has observed my performance of the services and has documented them in accordance with my direction.     Robbie Ronquillo D.O.  Emergency Medicine  [facility] EMERGENCY ROOM  [address] [id number] [phone]  Dept: [phone]      Robbie Ronquillo DO  02/09/23 2121

## 2023-02-10 NOTE — PROGRESS NOTES
Assessment & Plan   Problem List Items Addressed This Visit        Other    Elevated bilirubin     Dad with gilbert, mom has elevated bilirubin, possible gilbert as well        Other Visit Diagnoses     Encounter to establish care    -  Primary           Review of prior external note(s) from - Outside records from [facility] ED  No LOS data to display   Time spent doing chart review, history and exam, documentation and further activities per the note     MED REC REQUIRED  Post Medication Reconciliation Status:  Discharge medications reconciled, continue medications without change    Nicotine/Tobacco Cessation:  He reports that he has been smoking cigarettes and vaping device. He has never used smokeless tobacco.  Nicotine/Tobacco Cessation Plan:   Information offered: Patient not interested at this time    Return in 3 months for cbc, blood smear, reticulocyte count, lfts        Return in about 3 months (around 5/10/2023) for Follow up elevated bilirubin.    Zeinab Cabrera PA-C  [facility] AYLIN Bedolla is a 22 year old, presenting for the following health issues:  Follow Up (ER 2/9/23 for abdominal pain and painful urination. Just found out both parents have Elevated bilirubin as well/)      HPI       ED follow up - was seen yesterday at [facility] ED for abdominal pain and dysuria.  Bilirubin was elevated, texted his parents last night and found out dad has gilbert syndrome and mom has elevated bilirubin, possible gilbert syndrome as well.   Has h/o right inguinal hernia repair followed by revision x 2, has started to have pain in this area and has appt with surgery again next week.  Dysuria has improved today.  Denies n/v/d/c or abdominal pain.             Review of Systems   Constitutional, HEENT, cardiovascular, pulmonary, gi and gu systems are negative, except as otherwise noted.      Objective    /86   Pulse 68   Wt 88.6 kg (195 lb 7 oz)   SpO2 97%   There is  no height or weight on file to calculate BMI.  Physical Exam   GENERAL: healthy, alert and no distress  NECK: no adenopathy, no asymmetry, masses, or scars and thyroid normal to palpation  RESP: lungs clear to auscultation - no rales, rhonchi or wheezes  CV: regular rate and rhythm, normal S1 S2, , no peripheral edema and peripheral pulses strong  ABDOMEN: soft, mild rlq diffuse ttp, no hepatosplenomegaly, no masses and bowel sounds normal.  No rebound or guarding  MS: no gross musculoskeletal defects noted, no edema

## 2023-02-12 ENCOUNTER — HOSPITAL ENCOUNTER (EMERGENCY)
Facility: CLINIC | Age: 23
Discharge: HOME | End: 2023-02-12
Admitting: PHYSICIAN ASSISTANT
Payer: COMMERCIAL

## 2023-02-12 VITALS
RESPIRATION RATE: 16 BRPM | SYSTOLIC BLOOD PRESSURE: 143 MMHG | BODY MASS INDEX: 29.55 KG/M2 | HEIGHT: 68 IN | HEART RATE: 60 BPM | TEMPERATURE: 98.2 F | WEIGHT: 195 LBS | DIASTOLIC BLOOD PRESSURE: 86 MMHG | OXYGEN SATURATION: 96 %

## 2023-02-12 DIAGNOSIS — F41.0 ANXIETY ATTACK: ICD-10-CM

## 2023-02-12 PROCEDURE — 250N000013 HC RX MED GY IP 250 OP 250 PS 637: Performed by: PHYSICIAN ASSISTANT

## 2023-02-12 PROCEDURE — 99283 EMERGENCY DEPT VISIT LOW MDM: CPT

## 2023-02-12 RX ORDER — HYDROXYZINE HYDROCHLORIDE 25 MG/1
25 TABLET, FILM COATED ORAL EVERY 8 HOURS PRN
Qty: 20 TABLET | Refills: 0 | Status: SHIPPED | OUTPATIENT
Start: 2023-02-12 | End: 2023-03-07

## 2023-02-12 RX ORDER — HYDROXYZINE HYDROCHLORIDE 25 MG/1
25 TABLET, FILM COATED ORAL ONCE
Status: COMPLETED | OUTPATIENT
Start: 2023-02-12 | End: 2023-02-12

## 2023-02-12 RX ADMIN — HYDROXYZINE HYDROCHLORIDE 25 MG: 25 TABLET, FILM COATED ORAL at 12:00

## 2023-02-12 ASSESSMENT — ENCOUNTER SYMPTOMS
NAUSEA: 0
PALPITATIONS: 1
NERVOUS/ANXIOUS: 1
SHORTNESS OF BREATH: 1
COUGH: 0
CHILLS: 0
DIARRHEA: 0
NUMBNESS: 1
FEVER: 0
VOMITING: 0

## 2023-02-12 NOTE — DISCHARGE INSTRUCTIONS
Suspect likely anxiety attack.   Use the Hydroxyzine every 8 hours as needed for anxiety.  Follow up with primary care provider in 1 week.   If you develop new/worsening symptoms return to emergency department.

## 2023-02-12 NOTE — ED PROVIDER NOTES
EMERGENCY DEPARTMENT ENCOUNTER      NAME: Graeme Christian  AGE: 22 year old male  YOB: 2000  MRN: [id number]  EVALUATION DATE & TIME: 2/12/2023 11:09 AM    PCP: Zeinab Cabrera    ED PROVIDER: Candida Cervantes PA-C      Chief Complaint   Patient presents with     Panic Attack         FINAL IMPRESSION:  1. Anxiety attack          MEDICAL DECISION MAKING:    Pertinent Labs & Imaging studies reviewed. (See chart for details)  22 year old male presents to the Emergency Department for evaluation of anxiety attack while driving home from cabin today. Started to experience tingling in his bilateral hands, feet and face. Felt like he couldn't get a deep breath. Pulled over and called EMS. Denies history of anxiety however a lot of additional stress with recent move, getting new job and family/friends being diagnosed with illness.     Vitals reviewed and notable for elevated blood pressure. Blood pressure 2 days ago was normal. Suspect likely situational. Exam unremarkable. Lungs are clear. No focal neuro deficits. Differential diagnosis includes but not limited to anxiety, CVA, medication or substance use, thyroid dysfunction.    No SI/HI. Patient vitally stable. No focal neuro deficits. By time of arrival, he reports feeling improved but still anxious. He was given hydroxyzine with some relief though still notes mild paresthesias in his bilateral fingertips. Discussed likely cause and we talked about provoking factors. No indication for labs. Patient plans to follow up with PCP to discuss if daily anti-anxiety/depressants are warranted. He denies any substance use. Recently quit smoking marijuana for a job. Hydroxyzine prescribed PRN. Would avoid benzos. Discussed return precautions and patient discharged home in stable condition.     Medical Decision Making    History:    Supplemental history from: Documented in chart, if applicable    External Record(s) reviewed: Documented in chart, if applicable.    Work  Up:    Chart documentation includes differential considered and any EKGs or imaging independently interpreted by provider, where specified.    In additional to work up documented, I considered the following work up: Documented in chart, if applicable.    External consultation:    Discussion of management with another provider: Documented in chart, if applicable    Complicating factors:    Care impacted by chronic illness: N/A    Care affected by social determinants of health: N/A    Disposition considerations: Discharge. I prescribed additional prescription strength medication(s) as charted. N/A.      0 minutes of critical care time     ED COURSE:  11:35 AM I met with the patient, obtained history, performed an initial exam, and discussed options and plan for diagnostics and treatment here in the ED.  12:47 PM Patient discharged after being provided with extensive anticipatory guidance and given return precautions, importance of PCP follow-up emphasized.    At the conclusion of the encounter I discussed the results of all of the tests and the disposition. The questions were answered. The patient acknowledged understanding and was agreeable with the care plan.     MEDICATIONS GIVEN IN THE EMERGENCY:  Medications   hydrOXYzine (ATARAX) tablet 25 mg (25 mg Oral Given 2/12/23 1200)       NEW PRESCRIPTIONS STARTED AT TODAY'S ER VISIT  Discharge Medication List as of 2/12/2023 12:51 PM      START taking these medications    Details   hydrOXYzine (ATARAX) 25 MG tablet Take 1 tablet (25 mg) by mouth every 8 hours as needed for anxiety, Disp-20 tablet, R-0, E-Prescribe                  =================================================================    HPI:    Patient information was obtained from: Patient    Use of Interpretor: N/A      Graeme Christian is a 22 year old male with no pertinent history who presents to this ED via EMS for evaluation of anxiety.    Per chart review: Patient seen in ED on 2/9/23 for abd pain and  dysuria. CT Short-segment left lower quadrant small bowel intussusception. This is typically an incidental and transient finding. No obvious mass, bowel obstruction, or inflammatory process. Found to have elevated bilirubin for which GI recommended follow up with PCP. He was prescribed keflex for dysuria. STI testing was negative and UA large unremarkable. He followed up with PCP on 2/10/23 with additional history of family history of [place]. Plan to return in 3 months for lab recheck.     Patient was driving home from his cabin in [place] and started to feel anxious. He was intending to drive himself here to the ED when he was about 2 miles away and started to experience paresthesias in his bilateral hands, feet and face. He felt like he could no take a deep breath and was worried he would pass out. He called 911 and EMS brought him here. He denies any history of anxiety however reports a ot of stress recently pertaining to recently moving to the area. He is from [place] and moved her Jan 1st. 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 He has stopped smoking marijuana the last 2 weeks and is wondering if that was maybe suppressing underlying anxiety. He denies chest pain, fevers, chills, vomiting, diarrhea. He was drinking alcohol this weekend but states nothing excessive. He denies any drug use. Does use nicotine. He denies any SI/HI.     REVIEW OF SYSTEMS:  Review of Systems   Constitutional: Negative for chills and fever.   Respiratory: Positive for shortness of breath. Negative for cough.    Cardiovascular: Positive for palpitations. Negative for chest pain and leg swelling.   Gastrointestinal: Negative for diarrhea, nausea and vomiting.   Neurological: Positive for numbness (bilateral hands, feet and face).   Psychiatric/Behavioral: Negative for self-injury and suicidal ideas. The patient is nervous/anxious.    All other systems reviewed and are negative.      PAST MEDICAL HISTORY:  No past medical  "history on file.    PAST SURGICAL HISTORY:  Past Surgical History:   Procedure Laterality Date     HERNIA REPAIR Right     x 3           CURRENT MEDICATIONS:    No current facility-administered medications for this encounter.    Current Outpatient Medications:      hydrOXYzine (ATARAX) 25 MG tablet, Take 1 tablet (25 mg) by mouth every 8 hours as needed for anxiety, Disp: 20 tablet, Rfl: 0     cephALEXin (KEFLEX) 500 MG capsule, Take 1 capsule (500 mg) by mouth 2 times daily for 7 days, Disp: 28 capsule, Rfl: 0      ALLERGIES:  No Known Allergies    FAMILY HISTORY:  Family History   Problem Relation Age of Onset     No Known Problems Mother      Hypertension Father      No Known Problems Sister        SOCIAL HISTORY:   Social History     Socioeconomic History     Marital status: Single   Tobacco Use     Smoking status: Some Days     Types: Cigarettes, Vaping Device     Smokeless tobacco: Never   Substance and Sexual Activity     Drug use: Yes     Types: Marijuana   Social History Narrative    Originally from [facility].  Moved to [place] early 2023.  Lives on his own.  Works in construction.       VITALS:  Patient Vitals for the past 24 hrs:   BP Temp Temp src Pulse Resp SpO2 Height Weight   02/12/23 1223 (!) 143/86 -- -- 60 16 96 % -- --   02/12/23 1105 (!) 143/95 98.2  F (36.8  C) Temporal 80 22 100 % 1.727 m (5' 8\") 88.5 kg (195 lb)       PHYSICAL EXAM  Constitutional: Well developed, Well nourished, NAD  HENT: Normocephalic, Atraumatic, Bilateral external ears normal, Oropharynx normal, mucous membranes moist, Nose normal.   Neck: Supple, No stridor.  Eyes: Conjunctiva normal, No discharge.   Respiratory: Normal breath sounds, No respiratory distress, No wheezing, Speaks full sentences easily. No cough.  Cardiovascular: Normal heart rate, Regular rhythm, No murmurs, No rubs, No gallops. Chest wall nontender.  GI: Soft, No tenderness, No masses, No flank tenderness. No rebound or guarding.  Musculoskeletal: " No edema. No cyanosis, No clubbing. Good range of motion in all major joints.  Integument: Warm, Dry, No erythema, No rash. No petechiae.  Neurologic: Alert & oriented x 3, Normal motor function, Normal sensory function, No focal deficits noted. Normal gait.  Psychiatric: Affect normal, Judgment normal, Mood normal. Cooperative.      Candida Cervantes PA-C  Emergency Medicine  [facility]  2/12/2023     Candida Cervantes PA-C  02/12/23 1542

## 2023-02-12 NOTE — ED TRIAGE NOTES
Patient brought in by EMS, was driving in to be seen, felt like going to pass out and called EMS to bring him.  C/o tingling in face and finger tips, and anxiety.  He states he stopped smoking weed 1-2 weeks ago because he's trying to get a new job, no history of anxiety that he is aware of.  He has lot's of stressors right now.     Triage Assessment     Row Name 02/12/23 0916       Triage Assessment (Adult)    Airway WDL WDL       Respiratory WDL    Respiratory WDL WDL       Skin Circulation/Temperature WDL    Skin Circulation/Temperature WDL WDL       Cardiac WDL    Cardiac WDL WDL       Peripheral/Neurovascular WDL    Peripheral Neurovascular WDL WDL       Cognitive/Neuro/Behavioral WDL    Cognitive/Neuro/Behavioral WDL WDL

## 2023-02-14 ENCOUNTER — PATIENT OUTREACH (OUTPATIENT)
Dept: FAMILY MEDICINE | Facility: CLINIC | Age: 23
End: 2023-02-14
Payer: COMMERCIAL

## 2023-02-14 NOTE — TELEPHONE ENCOUNTER
"ED/Discharge Protocol    \"Hi, my name is Ewa Man RN, a registered nurse, and I am calling on behalf of Zeinab Cabrera's office at [place].  I am calling to follow up and see how things are going for you after your recent visit.\"    \"I see that you were in the (ER/UC/IP) on 02/12/2023  How are you doing now that you are home?\" Better    Is patient experiencing symptoms that may require a hospital visit?  Not now    Discharge Instructions    \"Let's review your discharge instructions.  What is/are the follow-up recommendations?  Pt. Response: appt    \"Were you instructed to make a follow-up appointment?\"  Pt. Response: Yes appt made    \"When you see the provider, I would recommend that you bring your discharge instructions with you.    Medications    \"How many new medications are you on since your hospitalization/ED visit?\"    0-1  \"How many of your current medicines changed (dose, timing, name, etc.) while you were in the hospital/ED visit?\"   0-1  \"Do you have questions about your medications?\"   No  \"Were you newly diagnosed with heart failure, COPD, diabetes or did you have a heart attack?\"   No  For patients on insulin: \"Did you start on insulin in the hospital or did you have your insulin dose changed?\"   No  Post Discharge Medication Reconciliation Status: unable to reconcile discharge medications due to RN Protocol.    Was MTM referral placed (*Make sure to put transitions as reason for referral)?   No    Call Summary    \"Do you have any questions or concerns about your condition or care plan at the moment?\"    No  Triage nurse advice given: No    Patient was in ER 2 in the past year (assess appropriateness of ER visits.)      \"If you have questions or things don't continue to improve, we encourage you contact us through the main clinic number,  [phone].  Even if the clinic is not open, triage nurses are available 24/7 to help you.     We would like you to know that our clinic has extended hours " "(provide information).  We also have urgent care (provide details on closest location and hours/contact info)\"    GENARO Mcneil  [facility]    \"Thank you for your time and take care!\"      "

## 2023-02-23 ENCOUNTER — VIRTUAL VISIT (OUTPATIENT)
Dept: FAMILY MEDICINE | Facility: CLINIC | Age: 23
End: 2023-02-23
Payer: COMMERCIAL

## 2023-02-23 DIAGNOSIS — F41.0 PANIC ATTACK: ICD-10-CM

## 2023-02-23 DIAGNOSIS — F41.9 ANXIETY: Primary | ICD-10-CM

## 2023-02-23 PROCEDURE — 96127 BRIEF EMOTIONAL/BEHAV ASSMT: CPT | Mod: VID | Performed by: PHYSICIAN ASSISTANT

## 2023-02-23 PROCEDURE — 99213 OFFICE O/P EST LOW 20 MIN: CPT | Mod: VID | Performed by: PHYSICIAN ASSISTANT

## 2023-02-23 ASSESSMENT — ANXIETY QUESTIONNAIRES
2. NOT BEING ABLE TO STOP OR CONTROL WORRYING: NOT AT ALL
GAD7 TOTAL SCORE: 0
GAD7 TOTAL SCORE: 0
IF YOU CHECKED OFF ANY PROBLEMS ON THIS QUESTIONNAIRE, HOW DIFFICULT HAVE THESE PROBLEMS MADE IT FOR YOU TO DO YOUR WORK, TAKE CARE OF THINGS AT HOME, OR GET ALONG WITH OTHER PEOPLE: NOT DIFFICULT AT ALL
4. TROUBLE RELAXING: NOT AT ALL
7. FEELING AFRAID AS IF SOMETHING AWFUL MIGHT HAPPEN: NOT AT ALL
7. FEELING AFRAID AS IF SOMETHING AWFUL MIGHT HAPPEN: NOT AT ALL
5. BEING SO RESTLESS THAT IT IS HARD TO SIT STILL: NOT AT ALL
8. IF YOU CHECKED OFF ANY PROBLEMS, HOW DIFFICULT HAVE THESE MADE IT FOR YOU TO DO YOUR WORK, TAKE CARE OF THINGS AT HOME, OR GET ALONG WITH OTHER PEOPLE?: NOT DIFFICULT AT ALL
GAD7 TOTAL SCORE: 0
6. BECOMING EASILY ANNOYED OR IRRITABLE: NOT AT ALL
1. FEELING NERVOUS, ANXIOUS, OR ON EDGE: NOT AT ALL
3. WORRYING TOO MUCH ABOUT DIFFERENT THINGS: NOT AT ALL
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
## 2023-03-06 DIAGNOSIS — F41.9 ANXIETY: Primary | ICD-10-CM

## 2023-03-06 NOTE — TELEPHONE ENCOUNTER
"Routing refill request to provider for review/approval because:  Reported as patietn not taking    Last Written Prescription Date:  2/12/23  Last Fill Quantity: 20,  # refills: 0   Last office visit provider:   2/23/23 VV with Rick    Requested Prescriptions   Pending Prescriptions Disp Refills     hydrOXYzine (ATARAX) 25 MG tablet 20 tablet 0     Sig: Take 1 tablet (25 mg) by mouth every 8 hours as needed for anxiety       Antihistamines Protocol Passed - 3/6/2023  3:08 PM        Passed - Recent (12 mo) or future (30 days) visit within the authorizing provider's specialty     Patient has had an office visit with the authorizing provider or a provider within the authorizing providers department within the previous 12 mos or has a future within next 30 days. See \"Patient Info\" tab in inbasket, or \"Choose Columns\" in Meds & Orders section of the refill encounter.              Passed - Patient is age 3 or older     Apply age and/or weight-based dosing for peds patients age 3 and older.    Forward request to provider for patients under the age of 3.          Passed - Medication is active on med list             RUSSELL MENDOZA RN 03/06/23 3:09 PM  "

## 2023-03-06 NOTE — TELEPHONE ENCOUNTER
New Medication Request        What medication are you requesting?: Needs med refilled within 5 days Hydroxyzine 25    Reason for medication request: above    Have you taken this medication before?: yes    Controlled Substance Agreement on file:   CSA -- Patient Level:    CSA: None found at the patient level.         Patient offered an appointment? Scheduled 3-21    Preferred Pharmacy:   [id number]  [place] [address] [address] [place] [address]  Phone: [phone] Fax: [phone]      Could we send this information to you in [employer] or would you prefer to receive a phone call?:   [phone] call ok to leave message

## 2023-03-07 ENCOUNTER — TELEPHONE (OUTPATIENT)
Dept: NURSING | Facility: CLINIC | Age: 23
End: 2023-03-07
Payer: COMMERCIAL

## 2023-03-07 RX ORDER — HYDROXYZINE HYDROCHLORIDE 25 MG/1
25 TABLET, FILM COATED ORAL EVERY 8 HOURS PRN
Qty: 20 TABLET | Refills: 0 | Status: SHIPPED | OUTPATIENT
Start: 2023-03-07 | End: 2023-03-24

## 2023-03-07 NOTE — TELEPHONE ENCOUNTER
Pt calling about refill for hydroxyzine 25 mg    Advised that RX for this medication was sent to pharmacy on 3/7/23 for 20 tabs, 0 refills.    Pt verbalized understanding.    Faby Barnes RN, Nurse Advisor 1:29 PM 3/7/2023   (0) independent

## 2023-03-10 ENCOUNTER — NURSE TRIAGE (OUTPATIENT)
Dept: FAMILY MEDICINE | Facility: CLINIC | Age: 23
End: 2023-03-10
Payer: COMMERCIAL

## 2023-03-10 NOTE — TELEPHONE ENCOUNTER
"Patient is in [facility], had to stop at hospital in [facility] last night due to \"anxiety attack my body just stopped working\"    Patient wants longer acting anxiety medication    Scheduled virtual appt for Monday, patient stated he will be back in the state by then.    RN asked who his support would be to call, he said he would call his family.    GENARO Mcneil  [facility]  "

## 2023-03-21 ENCOUNTER — VIRTUAL VISIT (OUTPATIENT)
Dept: FAMILY MEDICINE | Facility: CLINIC | Age: 23
End: 2023-03-21
Payer: COMMERCIAL

## 2023-03-21 DIAGNOSIS — F41.9 ANXIETY: Primary | ICD-10-CM

## 2023-03-21 DIAGNOSIS — F41.0 PANIC ATTACK: ICD-10-CM

## 2023-03-21 PROCEDURE — 99213 OFFICE O/P EST LOW 20 MIN: CPT | Mod: 95 | Performed by: PHYSICIAN ASSISTANT

## 2023-03-21 RX ORDER — ESCITALOPRAM OXALATE 10 MG/1
1 TABLET ORAL
COMMUNITY
Start: 2023-03-10 | End: 2023-03-21

## 2023-03-21 RX ORDER — ALPRAZOLAM 0.5 MG
1 TABLET ORAL
COMMUNITY
Start: 2023-03-10 | End: 2023-12-08

## 2023-03-21 ASSESSMENT — ANXIETY QUESTIONNAIRES
4. TROUBLE RELAXING: SEVERAL DAYS
3. WORRYING TOO MUCH ABOUT DIFFERENT THINGS: MORE THAN HALF THE DAYS
7. FEELING AFRAID AS IF SOMETHING AWFUL MIGHT HAPPEN: SEVERAL DAYS
2. NOT BEING ABLE TO STOP OR CONTROL WORRYING: MORE THAN HALF THE DAYS
6. BECOMING EASILY ANNOYED OR IRRITABLE: NOT AT ALL
IF YOU CHECKED OFF ANY PROBLEMS ON THIS QUESTIONNAIRE, HOW DIFFICULT HAVE THESE PROBLEMS MADE IT FOR YOU TO DO YOUR WORK, TAKE CARE OF THINGS AT HOME, OR GET ALONG WITH OTHER PEOPLE: NOT DIFFICULT AT ALL
GAD7 TOTAL SCORE: 8
1. FEELING NERVOUS, ANXIOUS, OR ON EDGE: MORE THAN HALF THE DAYS
5. BEING SO RESTLESS THAT IT IS HARD TO SIT STILL: NOT AT ALL
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
## 2023-03-24 ENCOUNTER — OFFICE VISIT (OUTPATIENT)
Dept: FAMILY MEDICINE | Facility: CLINIC | Age: 23
End: 2023-03-24
Payer: COMMERCIAL

## 2023-03-24 VITALS
RESPIRATION RATE: 16 BRPM | OXYGEN SATURATION: 98 % | TEMPERATURE: 98.2 F | DIASTOLIC BLOOD PRESSURE: 86 MMHG | HEART RATE: 58 BPM | SYSTOLIC BLOOD PRESSURE: 136 MMHG | HEIGHT: 69 IN | WEIGHT: 196 LBS | BODY MASS INDEX: 29.03 KG/M2

## 2023-03-24 DIAGNOSIS — Z01.818 PREOP GENERAL PHYSICAL EXAM: Primary | ICD-10-CM

## 2023-03-24 DIAGNOSIS — K46.9 ABDOMINAL HERNIA WITHOUT OBSTRUCTION AND WITHOUT GANGRENE, RECURRENCE NOT SPECIFIED, UNSPECIFIED HERNIA TYPE: ICD-10-CM

## 2023-03-24 PROCEDURE — 99213 OFFICE O/P EST LOW 20 MIN: CPT | Performed by: NURSE PRACTITIONER

## 2023-03-24 NOTE — PROGRESS NOTES
[facility]  [address] [address]  Phone: [phone]  Fax: [phone]  Primary Provider: Zeinab Cabrera  Pre-op Performing Provider: ORLANDO FINNEY      PREOPERATIVE EVALUATION:  Today's date: 3/24/2023    Graeme Christian is a 22 year old male who presents for a preoperative evaluation.  Additional Questions 3/24/2023   Roomed by Sendy MUELLER CMA     Surgical Information:  Surgery/Procedure: Hernia surgery   Surgery Location: [facility]  Surgeon: Dr Sánchez.  Surgery Date: 3-28-23  Time of Surgery: 11 AM  Where patient plans to recover: At home with family  Fax number for surgical facility: [phone]    Assessment & Plan     The proposed surgical procedure is considered INTERMEDIATE risk.    Preop general physical exam    Abdominal hernia without obstruction and without gangrene, recurrence not specified, unspecified hernia type          Medication Instructions:   - Benzodiazepines: Continue without modification.    RECOMMENDATION:  APPROVAL GIVEN to proceed with proposed procedure, without further diagnostic evaluation.        Subjective     HPI related to upcoming procedure:     Patient is scheduled for a hernia mesh removal.  History of inguinal hernia repair about 4-5 years ago.  This will be the 4th surgery on the hernia.  Reports intermittent pain to the area.    History of anxiety.  Utilizes Alprazolam intermittently for this.    Had labs completed on 3/10/2023, which were unremarkable.  Normal hemoglobin.     Preop Questions 3/24/2023   1. Have you ever had a heart attack or stroke? No   2. Have you ever had surgery on your heart or blood vessels, such as a stent placement, a coronary artery bypass, or surgery on an artery in your head, neck, heart, or legs? No   3. Do you have chest pain with activity? No   4. Do you have a history of  heart failure? No   5. Do you currently have a cold, bronchitis or symptoms of other infection?  No   6. Do you have a cough, shortness of breath, or wheezing? No   7. Do you or anyone in your family have previous history of blood clots? No   8. Do you or does anyone in your family have a serious bleeding problem such as prolonged bleeding following surgeries or cuts? No   9. Have you ever had problems with anemia or been told to take iron pills? No   10. Have you had any abnormal blood loss such as black, tarry or bloody stools? No   11. Have you ever had a blood transfusion? No   12. Are you willing to have a blood transfusion if it is medically needed before, during, or after your surgery? Yes   13. Have you or any of your relatives ever had problems with anesthesia? No   14. Do you have sleep apnea, excessive snoring or daytime drowsiness? No   15. Do you have any artifical heart valves or other implanted medical devices like a pacemaker, defibrillator, or continuous glucose monitor? No   16. Do you have artificial joints? No   17. Are you allergic to latex? No       Health Care Directive:  Patient does not have a Health Care Directive or Living Will: Discussed advance care planning with patient; however, patient declined at this time.    Preoperative Review of :   reviewed - controlled substances reflected in medication list.      Review of Systems  CONSTITUTIONAL: NEGATIVE for fever, chills, change in weight  INTEGUMENTARY/SKIN: NEGATIVE for worrisome rashes, moles or lesions  EYES: NEGATIVE for vision changes or irritation  ENT/MOUTH: NEGATIVE for ear, mouth and throat problems  RESP: NEGATIVE for significant cough or SOB  CV: NEGATIVE for chest pain, palpitations or peripheral edema  GI: NEGATIVE for nausea, abdominal pain, heartburn, or change in bowel habits  : NEGATIVE for frequency, dysuria, or hematuria  MUSCULOSKELETAL: NEGATIVE for significant arthralgias or myalgia  NEURO: NEGATIVE for weakness, dizziness or paresthesias  ENDOCRINE: NEGATIVE for temperature intolerance, skin/hair  "changes  HEME: NEGATIVE for bleeding problems  PSYCHIATRIC: NEGATIVE for changes in mood or affect    Patient Active Problem List    Diagnosis Date Noted     Elevated bilirubin 02/10/2023     Priority: Medium      No past medical history on file.  Past Surgical History:   Procedure Laterality Date     BONE CYST EXCISION Left      HERNIA REPAIR Right     x 3     Current Outpatient Medications   Medication Sig Dispense Refill     ALPRAZolam (XANAX) 0.5 MG tablet Take 1 tablet by mouth 3 times daily         No Known Allergies     Social History     Tobacco Use     Smoking status: Some Days     Types: Cigarettes, Vaping Device     Smokeless tobacco: Never   Substance Use Topics     Alcohol use: Not on file     Family History   Problem Relation Age of Onset     No Known Problems Mother      Hypertension Father      No Known Problems Sister      History   Drug Use     Types: Marijuana         Objective     /86   Pulse 58   Temp 98.2  F (36.8  C) (Temporal)   Resp 16   Ht 1.74 m (5' 8.5\")   Wt 88.9 kg (196 lb)   SpO2 98%   BMI 29.37 kg/m      Physical Exam    GENERAL APPEARANCE: healthy, alert and no distress     EYES: EOMI,  PERRL     HENT: ear canals and TM's normal and nose and mouth without ulcers or lesions     NECK: no adenopathy, no asymmetry, masses, or scars and thyroid normal to palpation     RESP: lungs clear to auscultation - no rales, rhonchi or wheezes     CV: regular rates and rhythm, normal S1 S2, no S3 or S4 and no murmur, click or rub     ABDOMEN:  soft, nontender, no HSM or masses and bowel sounds normal     MS: extremities normal- no gross deformities noted, no evidence of inflammation in joints, FROM in all extremities.     SKIN: no suspicious lesions or rashes     NEURO: Normal strength and tone, sensory exam grossly normal, mentation intact and speech normal     PSYCH: mentation appears normal. and affect normal/bright     LYMPHATICS: No cervical adenopathy    Recent Labs   Lab Test " 02/09/23 1941   HGB 14.9         POTASSIUM 3.4*   CR 0.85        Diagnostics:  No results found for this or any previous visit (from the past 720 hour(s)).   No EKG required, no history of coronary heart disease, significant arrhythmia, peripheral arterial disease or other structural heart disease.    Revised Cardiac Risk Index (RCRI):  The patient has the following serious cardiovascular risks for perioperative complications:   - No serious cardiac risks = 0 points     RCRI Interpretation: 0 points: Class I (very low risk - 0.4% complication rate)           Signed Electronically by: Kate Herron NP  Copy of this evaluation report is provided to requesting physician.

## 2023-03-24 NOTE — PATIENT INSTRUCTIONS
For informational purposes only. Not to replace the advice of your health care provider. Copyright   2003,  [facility]. All rights reserved. Clinically reviewed by Brandy Cunningham MD. EPV SOL[facility] [id number] - REV .  Preparing for Your Surgery  Getting started  A nurse will call you to review your health history and instructions. They will give you an arrival time based on your scheduled surgery time. Please be ready to share:    Your doctor's clinic name and phone number    Your medical, surgical, and anesthesia history    A list of allergies and sensitivities    A list of medicines, including herbal treatments and over-the-counter drugs    Whether the patient has a legal guardian (ask how to send us the papers in advance)  Please tell us if you're pregnant--or if there's any chance you might be pregnant. Some surgeries may injure a fetus (unborn baby), so they require a pregnancy test. Surgeries that are safe for a fetus don't always need a test, and you can choose whether to have one.   If you have a child who's having surgery, please ask for a copy of Preparing for Your Child's Surgery.    Preparing for surgery    Within 10 to 30 days of surgery: Have a pre-op exam (sometimes called an H&P, or History and Physical). This can be done at a clinic or pre-operative center.  ? If you're having a , you may not need this exam. Talk to your care team.    At your pre-op exam, talk to your care team about all medicines you take. If you need to stop any medicines before surgery, ask when to start taking them again.  ? We do this for your safety. Many medicines can make you bleed too much during surgery. Some change how well surgery (anesthesia) drugs work.    Call your insurance company to let them know you're having surgery. (If you don't have insurance, call [phone].)    Call your clinic if there's any change in your health. This includes signs of a cold or flu (sore throat, runny nose,  cough, rash, fever). It also includes a scrape or scratch near the surgery site.    If you have questions on the day of surgery, call your hospital or surgery center.  Eating and drinking guidelines  For your safety: Unless your surgeon tells you otherwise, follow the guidelines below.    Eat and drink as usual until 8 hours before you arrive for surgery. After that, no food or milk.    Drink clear liquids until 2 hours before you arrive. These are liquids you can see through, like water, Gatorade, and Propel Water. They also include plain black coffee and tea (no cream or milk), candy, and breath mints. You can spit out gum when you arrive.    If you drink alcohol: Stop drinking it the night before surgery.    If your care team tells you to take medicine on the morning of surgery, it's okay to take it with a sip of water.  Preventing infection    Shower or bathe the night before and morning of your surgery. Follow the instructions your clinic gave you. (If no instructions, use regular soap.)    Don't shave or clip hair near your surgery site. We'll remove the hair if needed.    Don't smoke or vape the morning of surgery. You may chew nicotine gum up to 2 hours before surgery. A nicotine patch is okay.  ? Note: Some surgeries require you to completely quit smoking and nicotine. Check with your surgeon.    Your care team will make every effort to keep you safe from infection. We will:  ? Clean our hands often with soap and water (or an alcohol-based hand rub).  ? Clean the skin at your surgery site with a special soap that kills germs.  ? Give you a special gown to keep you warm. (Cold raises the risk of infection.)  ? Wear special hair covers, masks, gowns and gloves during surgery.  ? Give antibiotic medicine, if prescribed. Not all surgeries need antibiotics.  What to bring on the day of surgery    Photo ID and insurance card    Copy of your health care directive, if you have one    Glasses and hearing aids (bring  cases)  ? You can't wear contacts during surgery    Inhaler and eye drops, if you use them (tell us about these when you arrive)    CPAP machine or breathing device, if you use them    A few personal items, if spending the night    If you have . . .  ? A pacemaker, ICD (cardiac defibrillator) or other implant: Bring the ID card.  ? An implanted stimulator: Bring the remote control.  ? A legal guardian: Bring a copy of the certified (court-stamped) guardianship papers.  Please remove any jewelry, including body piercings. Leave jewelry and other valuables at home.  If you're going home the day of surgery    You must have a responsible adult drive you home. They should stay with you overnight as well.    If you don't have someone to stay with you, and you aren't safe to go home alone, we may keep you overnight. Insurance often won't pay for this.  After surgery  If it's hard to control your pain or you need more pain medicine, please call your surgeon's office.  Questions?   If you have any questions for your care team, list them here: _________________________________________________________________________________________________________________________________________________________________________ ____________________________________ ____________________________________ ____________________________________

## 2023-05-03 RX ORDER — CETIRIZINE HYDROCHLORIDE 10 MG/1
1 TABLET ORAL DAILY
COMMUNITY
Start: 2023-04-17 | End: 2024-08-02

## 2023-05-03 RX ORDER — FLUTICASONE PROPIONATE 50 MCG
1 SPRAY, SUSPENSION (ML) NASAL
COMMUNITY
Start: 2023-04-17 | End: 2024-08-02

## 2023-05-04 ASSESSMENT — ANXIETY QUESTIONNAIRES
GAD7 TOTAL SCORE: 6
7. FEELING AFRAID AS IF SOMETHING AWFUL MIGHT HAPPEN: SEVERAL DAYS
2. NOT BEING ABLE TO STOP OR CONTROL WORRYING: MORE THAN HALF THE DAYS
8. IF YOU CHECKED OFF ANY PROBLEMS, HOW DIFFICULT HAVE THESE MADE IT FOR YOU TO DO YOUR WORK, TAKE CARE OF THINGS AT HOME, OR GET ALONG WITH OTHER PEOPLE?: NOT DIFFICULT AT ALL
GAD7 TOTAL SCORE: 6
4. TROUBLE RELAXING: NOT AT ALL
1. FEELING NERVOUS, ANXIOUS, OR ON EDGE: SEVERAL DAYS
IF YOU CHECKED OFF ANY PROBLEMS ON THIS QUESTIONNAIRE, HOW DIFFICULT HAVE THESE PROBLEMS MADE IT FOR YOU TO DO YOUR WORK, TAKE CARE OF THINGS AT HOME, OR GET ALONG WITH OTHER PEOPLE: NOT DIFFICULT AT ALL
3. WORRYING TOO MUCH ABOUT DIFFERENT THINGS: MORE THAN HALF THE DAYS
7. FEELING AFRAID AS IF SOMETHING AWFUL MIGHT HAPPEN: SEVERAL DAYS
GAD7 TOTAL SCORE: 6
6. BECOMING EASILY ANNOYED OR IRRITABLE: NOT AT ALL
5. BEING SO RESTLESS THAT IT IS HARD TO SIT STILL: NOT AT ALL

## 2023-05-05 ENCOUNTER — OFFICE VISIT (OUTPATIENT)
Dept: FAMILY MEDICINE | Facility: CLINIC | Age: 23
End: 2023-05-05
Payer: COMMERCIAL

## 2023-05-05 VITALS
SYSTOLIC BLOOD PRESSURE: 114 MMHG | HEIGHT: 69 IN | TEMPERATURE: 98 F | BODY MASS INDEX: 29.82 KG/M2 | RESPIRATION RATE: 13 BRPM | HEART RATE: 70 BPM | WEIGHT: 201.3 LBS | DIASTOLIC BLOOD PRESSURE: 69 MMHG | OXYGEN SATURATION: 98 %

## 2023-05-05 DIAGNOSIS — H65.03 NON-RECURRENT ACUTE SEROUS OTITIS MEDIA OF BOTH EARS: ICD-10-CM

## 2023-05-05 DIAGNOSIS — F41.9 ANXIETY: ICD-10-CM

## 2023-05-05 DIAGNOSIS — J30.89 SEASONAL ALLERGIC RHINITIS DUE TO OTHER ALLERGIC TRIGGER: Primary | ICD-10-CM

## 2023-05-05 PROBLEM — Z11.59 NEED FOR HEPATITIS C SCREENING TEST: Status: ACTIVE | Noted: 2023-05-05

## 2023-05-05 PROBLEM — Z11.4 SCREENING FOR HIV (HUMAN IMMUNODEFICIENCY VIRUS): Status: ACTIVE | Noted: 2023-05-05

## 2023-05-05 PROCEDURE — 99213 OFFICE O/P EST LOW 20 MIN: CPT | Performed by: PHYSICIAN ASSISTANT

## 2023-05-05 ASSESSMENT — ENCOUNTER SYMPTOMS: NERVOUS/ANXIOUS: 1

## 2023-05-05 ASSESSMENT — ANXIETY QUESTIONNAIRES: GAD7 TOTAL SCORE: 6

## 2023-05-05 NOTE — PROGRESS NOTES
Assessment & Plan     1. Anxiety  Stable, we did discuss starting a daily medication like an SSRI he had a bad experience with Lexapro in the past and does not want to start a daily medication at this time    2. Seasonal allergic rhinitis due to other allergic trigger  Continue daily antihistamine and Flonase    3. Non-recurrent acute serous otitis media of both ears  Reassured this will go away with time okay to use Sudafed if needed for congestion.      Follow-up with any new or worsening symptoms      [id number]}    Zeinab Cabrera PA-C  [facility] AYLIN Bedolla is a 22 year old, presenting for the following health issues:  Urgent Care (Follow up urgent care [facility] 4/17/23) and Anxiety (Would like to discuss anxiety today.)        5/5/2023     7:22 AM   Additional Questions   Roomed by Sendy ACUNA MA     Anxiety    History of Present Illness       Mental Health Follow-up:  Patient presents to follow-up on Anxiety.    Patient's anxiety since last visit has been:  Good  The patient is having other symptoms associated with anxiety.  Any significant life events: No  Patient is not feeling anxious or having panic attacks.  Patient has no concerns about alcohol or drug use.    Reason for visit:  Anxiety follow up and ear pressure  Symptom onset:  1-2 weeks ago  Symptoms include:  Pressure behind my ears snd a lump in my throat  Symptom intensity:  Mild  Symptom progression:  Staying the same  Had these symptoms before:  No  What makes it worse:  No  What makes it better:  Sleeping    He eats 2-3 servings of fruits and vegetables daily.He consumes 1 sweetened beverage(s) daily.He exercises with enough effort to increase his heart rate 30 to 60 minutes per day.  He exercises with enough effort to increase his heart rate 5 days per week.   He is taking medications regularly.  Today's JOSHUA-7 Score: 6     Pressure in bilateral ears and feels lump in this throat.  Denies pain in ears or  "throat, no recent uri.   Was seen in urgent care last month and diagnosed with pnd from allergies.  Taking zyrtec daily and flonase daily as well.  Symptoms have improved but are not completely resolved.   Denies fevers, chills, body aches.      Anxiety- feels that it is improving overall, has alprozolam to take prn, finds he takes about 1 tab weekly.   Has been on lexapro in the past and took it for 1.5 weeks and stopped because he felt chilled and this worried him.    Review of Systems   Psychiatric/Behavioral: The patient is nervous/anxious.       Constitutional, HEENT, cardiovascular, pulmonary, gi and gu systems are negative, except as otherwise noted.      Objective    /69 (BP Location: Left arm, Patient Position: Sitting, Cuff Size: Adult Regular)   Pulse 70   Temp 98  F (36.7  C) (Oral)   Resp 13   Ht 1.74 m (5' 8.5\")   Wt 91.3 kg (201 lb 4.8 oz)   SpO2 98%   BMI 30.16 kg/m    Body mass index is 30.16 kg/m .  Physical Exam   GENERAL: healthy, alert and no distress  EYES: Eyes grossly normal to inspection, PERRL and conjunctivae and sclerae normal  HENT: ear canals and TM's normal, nose and mouth without ulcers or lesions  NECK: no adenopathy, no asymmetry, masses, or scars and thyroid normal to palpation  RESP: lungs clear to auscultation - no rales, rhonchi or wheezes  CV: regular rate and rhythm, normal S1 S2,  no peripheral edema and peripheral pulses strong                    "

## 2023-05-21 ENCOUNTER — HEALTH MAINTENANCE LETTER (OUTPATIENT)
Age: 23
End: 2023-05-21

## 2023-06-17 ENCOUNTER — HOSPITAL ENCOUNTER (EMERGENCY)
Facility: CLINIC | Age: 23
Discharge: HOME | End: 2023-06-17
Admitting: EMERGENCY MEDICINE
Payer: COMMERCIAL

## 2023-06-17 VITALS
RESPIRATION RATE: 20 BRPM | TEMPERATURE: 98 F | DIASTOLIC BLOOD PRESSURE: 87 MMHG | OXYGEN SATURATION: 99 % | HEART RATE: 68 BPM | SYSTOLIC BLOOD PRESSURE: 143 MMHG

## 2023-06-17 PROCEDURE — 99281 EMR DPT VST MAYX REQ PHY/QHP: CPT

## 2023-06-17 NOTE — ED TRIAGE NOTES
"Pt arrives with ear fullness, increased \"popping\" and tinnitus. States it has been going on for several months but has been particularly bad this past week. Was taking allergy medications for relief but feels they are no longer helping. ABCs intact.      Triage Assessment     Row Name 06/17/23 7153       Triage Assessment (Adult)    Airway WDL WDL       Respiratory WDL    Respiratory WDL WDL       Cardiac WDL    Cardiac WDL WDL       Cognitive/Neuro/Behavioral WDL    Cognitive/Neuro/Behavioral WDL WDL              "

## 2023-06-19 ENCOUNTER — HOSPITAL ENCOUNTER (EMERGENCY)
Facility: CLINIC | Age: 23
Discharge: HOME | End: 2023-06-19
Attending: STUDENT IN AN ORGANIZED HEALTH CARE EDUCATION/TRAINING PROGRAM | Admitting: STUDENT IN AN ORGANIZED HEALTH CARE EDUCATION/TRAINING PROGRAM
Payer: COMMERCIAL

## 2023-06-19 VITALS
HEART RATE: 86 BPM | SYSTOLIC BLOOD PRESSURE: 134 MMHG | DIASTOLIC BLOOD PRESSURE: 87 MMHG | RESPIRATION RATE: 20 BRPM | OXYGEN SATURATION: 98 % | TEMPERATURE: 97.9 F

## 2023-06-19 DIAGNOSIS — R09.89 THROAT TIGHTNESS: ICD-10-CM

## 2023-06-19 PROCEDURE — 99282 EMERGENCY DEPT VISIT SF MDM: CPT

## 2023-06-19 NOTE — ED TRIAGE NOTES
"Pt presents to the ED with complaint of throat tightness. Pt states he feels like there is a lump in his throat. He states he is swallowing and breathing fine, but it's hard to \"get the motion\" of swallowing when he begins to eat. He also complains of ears popping, pressure, and ringing. Pt states this started the end of April. He states he was seen for this and he was diagnosed with allergies, but he states allergy medications do not make symptoms better.      Triage Assessment     Row Name 06/19/23 7840       Triage Assessment (Adult)    Airway WDL WDL       Respiratory WDL    Respiratory WDL WDL       Skin Circulation/Temperature WDL    Skin Circulation/Temperature WDL WDL       Cardiac WDL    Cardiac WDL WDL       Peripheral/Neurovascular WDL    Peripheral Neurovascular WDL WDL       Cognitive/Neuro/Behavioral WDL    Cognitive/Neuro/Behavioral WDL WDL              "

## 2023-06-19 NOTE — DISCHARGE INSTRUCTIONS
Return to the emergency department if symptoms are worsening, become concerning, or for any other concerns. Follow-up with your doctor in 2-3 and sooner if needed.    If you have difficulty swallowing, or pain with swallowing or inability to swallow, return promptly to the ED or be seen by your doctor.  If you develop fever, weight loss, night sweats, also be assessed

## 2023-12-08 ENCOUNTER — OFFICE VISIT (OUTPATIENT)
Dept: FAMILY MEDICINE | Facility: CLINIC | Age: 23
End: 2023-12-08
Payer: COMMERCIAL

## 2023-12-08 VITALS
HEIGHT: 68 IN | BODY MASS INDEX: 31.07 KG/M2 | SYSTOLIC BLOOD PRESSURE: 132 MMHG | OXYGEN SATURATION: 98 % | TEMPERATURE: 97.8 F | HEART RATE: 71 BPM | DIASTOLIC BLOOD PRESSURE: 84 MMHG | WEIGHT: 205 LBS | RESPIRATION RATE: 13 BRPM

## 2023-12-08 DIAGNOSIS — M26.609 TMJ (TEMPOROMANDIBULAR JOINT SYNDROME): Primary | ICD-10-CM

## 2023-12-08 PROCEDURE — 99213 OFFICE O/P EST LOW 20 MIN: CPT | Performed by: PHYSICIAN ASSISTANT

## 2023-12-08 RX ORDER — NAPROXEN 500 MG/1
500 TABLET ORAL 2 TIMES DAILY WITH MEALS
Qty: 20 TABLET | Refills: 0 | Status: SHIPPED | OUTPATIENT
Start: 2023-12-08 | End: 2024-08-02

## 2023-12-08 RX ORDER — CYCLOBENZAPRINE HCL 5 MG
5 TABLET ORAL DAILY PRN
Qty: 20 TABLET | Refills: 0 | Status: SHIPPED | OUTPATIENT
Start: 2023-12-08 | End: 2024-08-02

## 2023-12-08 NOTE — PROGRESS NOTES
"  Assessment & Plan     TMJ (temporomandibular joint syndrome)  - naproxen (NAPROSYN) 500 MG tablet  Dispense: 20 tablet; Refill: 0  - cyclobenzaprine (FLEXERIL) 5 MG tablet  Dispense: 20 tablet; Refill: 0  - Physical Therapy Referral  - Adult ENT  Referral    Has appt with dentistry in a couple of weeks   Follow up if symptoms persist    BMI:   Estimated body mass index is 31.17 kg/m  as calculated from the following:    Height as of this encounter: 1.727 m (5' 8\").    Weight as of this encounter: 93 kg (205 lb).   Weight management plan: not addressed      [facility]    Ovidio Bedolla is a 23 year old, presenting for the following health issues:  Second Medical Opinion         12/8/2023    10:36 AM   Additional Questions   Roomed by Sendy CORRALES     Bilateral jaw pain x 6 months, has seen ENT and diagnosed with TMJ would like a second opinion has lump under right chin he had an ultrasound of this with ENT and was noted to be normal gland.  Continues to have pain with chewing on both sides of his jaw but worse on the right he has not seen his dentist recently but does have an upcoming appointment scheduled he has not tried anti-inflammatories or physical therapy      Review of Systems   Constitutional, HEENT, cardiovascular, pulmonary, gi and gu systems are negative, except as otherwise noted.      Objective    /84   Pulse 71   Temp 97.8  F (36.6  C) (Temporal)   Resp 13   Ht 1.727 m (5' 8\")   Wt 93 kg (205 lb)   SpO2 98%   BMI 31.17 kg/m    Body mass index is 31.17 kg/m .  Physical Exam   GENERAL: healthy, alert and no distress  HENT: ear canals and TM's normal, nose and mouth without ulcers or lesions  + clicking with opening and closing of tmj bilaterally  NECK: no adenopathy, no asymmetry, masses, or scars and thyroid normal to palpation  RESP: lungs clear to auscultation - no rales, rhonchi or wheezes  CV: regular rate and " rhythm, normal S1 S2,   MS: no gross musculoskeletal defects noted, no edema

## 2024-07-28 ENCOUNTER — HEALTH MAINTENANCE LETTER (OUTPATIENT)
Age: 24
End: 2024-07-28

## 2024-07-31 PROBLEM — R10.31 RIGHT LOWER QUADRANT ABDOMINAL PAIN: Status: ACTIVE | Noted: 2018-05-29

## 2024-07-31 PROBLEM — M60.20: Status: ACTIVE | Noted: 2022-06-06

## 2024-07-31 PROBLEM — M79.10 MYALGIA: Status: ACTIVE | Noted: 2018-07-27

## 2024-07-31 PROBLEM — D48.19 NEOPLASM OF UNCERTAIN BEHAVIOR OF SOFT TISSUES OF ABDOMEN: Status: ACTIVE | Noted: 2018-07-23

## 2024-08-02 ENCOUNTER — OFFICE VISIT (OUTPATIENT)
Dept: FAMILY MEDICINE | Facility: CLINIC | Age: 24
End: 2024-08-02
Payer: COMMERCIAL

## 2024-08-02 VITALS
SYSTOLIC BLOOD PRESSURE: 123 MMHG | BODY MASS INDEX: 30.01 KG/M2 | HEART RATE: 56 BPM | TEMPERATURE: 97.9 F | WEIGHT: 198 LBS | DIASTOLIC BLOOD PRESSURE: 80 MMHG | RESPIRATION RATE: 12 BRPM | OXYGEN SATURATION: 98 % | HEIGHT: 68 IN

## 2024-08-02 DIAGNOSIS — Z11.4 SCREENING FOR HIV (HUMAN IMMUNODEFICIENCY VIRUS): Primary | ICD-10-CM

## 2024-08-02 DIAGNOSIS — Z82.49 FH: CAD (CORONARY ARTERY DISEASE): ICD-10-CM

## 2024-08-02 DIAGNOSIS — Z11.59 NEED FOR HEPATITIS C SCREENING TEST: ICD-10-CM

## 2024-08-02 PROCEDURE — 99213 OFFICE O/P EST LOW 20 MIN: CPT | Performed by: PHYSICIAN ASSISTANT

## 2024-08-02 ASSESSMENT — ANXIETY QUESTIONNAIRES
4. TROUBLE RELAXING: NOT AT ALL
GAD7 TOTAL SCORE: 0
5. BEING SO RESTLESS THAT IT IS HARD TO SIT STILL: NOT AT ALL
6. BECOMING EASILY ANNOYED OR IRRITABLE: NOT AT ALL
3. WORRYING TOO MUCH ABOUT DIFFERENT THINGS: NOT AT ALL
1. FEELING NERVOUS, ANXIOUS, OR ON EDGE: NOT AT ALL
2. NOT BEING ABLE TO STOP OR CONTROL WORRYING: NOT AT ALL
7. FEELING AFRAID AS IF SOMETHING AWFUL MIGHT HAPPEN: NOT AT ALL
8. IF YOU CHECKED OFF ANY PROBLEMS, HOW DIFFICULT HAVE THESE MADE IT FOR YOU TO DO YOUR WORK, TAKE CARE OF THINGS AT HOME, OR GET ALONG WITH OTHER PEOPLE?: NOT DIFFICULT AT ALL
IF YOU CHECKED OFF ANY PROBLEMS ON THIS QUESTIONNAIRE, HOW DIFFICULT HAVE THESE PROBLEMS MADE IT FOR YOU TO DO YOUR WORK, TAKE CARE OF THINGS AT HOME, OR GET ALONG WITH OTHER PEOPLE: NOT DIFFICULT AT ALL
GAD7 TOTAL SCORE: 0
GAD7 TOTAL SCORE: 0
7. FEELING AFRAID AS IF SOMETHING AWFUL MIGHT HAPPEN: NOT AT ALL

## 2024-08-02 ASSESSMENT — PATIENT HEALTH QUESTIONNAIRE - PHQ9
SUM OF ALL RESPONSES TO PHQ QUESTIONS 1-9: 0
SUM OF ALL RESPONSES TO PHQ QUESTIONS 1-9: 0
10. IF YOU CHECKED OFF ANY PROBLEMS, HOW DIFFICULT HAVE THESE PROBLEMS MADE IT FOR YOU TO DO YOUR WORK, TAKE CARE OF THINGS AT HOME, OR GET ALONG WITH OTHER PEOPLE: NOT DIFFICULT AT ALL

## 2024-08-02 NOTE — PROGRESS NOTES
"  Assessment & Plan     Screening for HIV (human immunodeficiency virus)  Pt declines     Need for hepatitis C screening test  Pt declines    FH: CAD (coronary artery disease)  Father has been diagnosed with hypertropic cardiomyopathy, pt denies cp or sob or dizziness   Will get echo  - Echocardiogram Complete            BMI  Estimated body mass index is 30.11 kg/m  as calculated from the following:    Height as of this encounter: 1.727 m (5' 8\").    Weight as of this encounter: 89.8 kg (198 lb).   Weight management plan: not addressed        Subjective   Graeme is a 24 year old, presenting for the following health issues:  Consult (Hypertrophic Cardiomyopathy lab test, referral echo cardiogram)      8/2/2024     6:46 AM   Additional Questions   Roomed by Sly     History of Present Illness       Reason for visit:  Ecocardiogram    He eats 2-3 servings of fruits and vegetables daily.He consumes 3 sweetened beverage(s) daily.He exercises with enough effort to increase his heart rate 60 or more minutes per day.  He exercises with enough effort to increase his heart rate 7 days per week.   He is taking medications regularly.       Heart concerns- his father was diagnosed with hypertrophic cardiomyopathy, he is here to discuss screening for this.  Denies cp or sob.  Does not feel sob or dizzy with exercise.  Feels he has good exercise tolerance.  Notes one syncopal episode many years ago when he was trying to lose weight quickly for the .        Review of Systems  Constitutional, HEENT, cardiovascular, pulmonary, gi and gu systems are negative, except as otherwise noted.      Objective    /80   Pulse 56   Temp 97.9  F (36.6  C) (Oral)   Resp 12   Ht 1.727 m (5' 8\")   Wt 89.8 kg (198 lb)   SpO2 98%   BMI 30.11 kg/m    Body mass index is 30.11 kg/m .  Physical Exam   GENERAL: alert and no distress  EYES: Eyes grossly normal to inspection, PERRL and conjunctivae and sclerae normal  NECK: no adenopathy, " no asymmetry, masses, or scars  RESP: lungs clear to auscultation - no rales, rhonchi or wheezes  CV: regular rate and rhythm, normal S1 S2, no S3 or S4, no murmur, click or rub, no peripheral edema  MS: no gross musculoskeletal defects noted, no edema  SKIN: no suspicious lesions or rashes  NEURO: Normal strength and tone, mentation intact and speech normal  PSYCH: mentation appears normal, affect normal/bright            Signed Electronically by: Zeinab Cabrera PA-C

## 2024-08-30 ENCOUNTER — HOSPITAL ENCOUNTER (OUTPATIENT)
Dept: CARDIOLOGY | Facility: CLINIC | Age: 24
Discharge: HOME | End: 2024-08-30
Attending: PHYSICIAN ASSISTANT | Admitting: PHYSICIAN ASSISTANT
Payer: COMMERCIAL

## 2024-08-30 DIAGNOSIS — Z82.49 FH: CAD (CORONARY ARTERY DISEASE): ICD-10-CM

## 2024-08-30 DIAGNOSIS — I51.7 LEFT VENTRICULAR HYPERTROPHY: Primary | ICD-10-CM

## 2024-08-30 LAB — LVEF ECHO: NORMAL

## 2024-08-30 PROCEDURE — 93306 TTE W/DOPPLER COMPLETE: CPT | Mod: 26 | Performed by: INTERNAL MEDICINE

## 2024-08-30 PROCEDURE — 93306 TTE W/DOPPLER COMPLETE: CPT

## 2024-09-04 ENCOUNTER — TELEPHONE (OUTPATIENT)
Dept: CARDIOLOGY | Facility: CLINIC | Age: 24
End: 2024-09-04
Payer: COMMERCIAL

## 2024-09-04 DIAGNOSIS — Z82.49 FH: CAD (CORONARY ARTERY DISEASE): Primary | ICD-10-CM

## 2024-09-04 DIAGNOSIS — Z82.49 FAMILY HISTORY OF HYPERTROPHIC CARDIOMYOPATHY: ICD-10-CM

## 2024-09-04 DIAGNOSIS — I51.7 LEFT VENTRICULAR HYPERTROPHY: ICD-10-CM

## 2024-09-04 NOTE — TELEPHONE ENCOUNTER
Health Call Center    Phone Message    May a detailed message be left on voicemail: yes     Reason for Call: Other: Graeme called to schedule his referral for a congenital/genetics appt. Please reach out to Graeme to schedule. Thank you!     Action Taken: Other: Cardiology    Travel Screening: Not Applicable    Thank you!  [facility]       Date of Service:

## 2024-09-05 NOTE — TELEPHONE ENCOUNTER
Date: 9/5/2024    Time of Call: 11:00 AM     Diagnosis:  father with HCM     [ TORB ] Ordering provider: Aime Suarez MD  Order: CMR, 14 day zio, EKG, labs and appt in HCM clinic     Order received by: Candida Pittman RN    Follow-up/additional notes: spoke with patient. Referral from [facility] appt, echo was completed after appt     Your echo cardiogram does show mild enlargement of the left side of your heart.  It is not urgent but I would like you to schedule an appointment with cardiology given your dad's history.  I put in a referral and they should reach out next week to set that appointment up.  It may take a couple of months to get in to cardiology and that is ok to wait that long.     Dicussed appt with Dr Suarez with a zio patch prior, Patient verbalized understanding and is in agreement to the plan. Sent to scheduling

## 2024-09-09 NOTE — TELEPHONE ENCOUNTER
Called and spoke with Graeme to discuss recommendation for cMRI prior to visit with Dr. Suarez in January. Patient agrees and states that he has had cMRI done in the past without any issues. Inquired if Graeme's father has had any genetic testing done in the past and if Graeme would like to meet with a genetic counselor. Patient states he will talk with his father to find out if he has completed genetic testing and to obtain his father's medical records. Graeme will let us know if he would like to complete genetic testing. Sent to scheduling.     Bharathi Fuentes RN

## 2024-09-27 NOTE — TELEPHONE ENCOUNTER
Attempted to reach pt to reschedule their appts and schedule testing, no answer and left a detailed message

## 2024-10-07 ENCOUNTER — TELEPHONE (OUTPATIENT)
Dept: CARDIOLOGY | Facility: CLINIC | Age: 24
End: 2024-10-07
Payer: COMMERCIAL

## 2024-10-07 NOTE — LETTER
October 7, 2024      TO: Graeme Christian  [address] [facility] [address]         Dear Graeme,    Our records indicate that it is time for you to schedule testing that Dr. Suarez would like for you to complete. We have been unsuccessful in connecting with you.    To make your appointment, please call: [phone], Monday - Friday, 8 A.M. - 4:30 P.M (Central Time Zone).    Please check with your insurance company about your benefits.  Some insurance plans provide different coverage levels for services at [facility] hospital-based clinics.     We look forward to hearing from you.    Sincerely,    Lurdes Crane [facility]  Complex    Adult Congenital and Genetic Clinic  Office: [phone]  Fax: [phone]

## 2024-10-07 NOTE — TELEPHONE ENCOUNTER
2nd attempt reaching out to pt to schedule appt, no answer, LVM     1st attempt was made on 9/27    Sent letter. Max attempts, if patient wishes to still be seen, please call [phone]

## 2024-11-25 ENCOUNTER — HOSPITAL ENCOUNTER (EMERGENCY)
Facility: CLINIC | Age: 24
Discharge: HOME | End: 2024-11-25
Attending: PHYSICIAN ASSISTANT | Admitting: PHYSICIAN ASSISTANT
Payer: COMMERCIAL

## 2024-11-25 ENCOUNTER — APPOINTMENT (OUTPATIENT)
Dept: GENERAL RADIOLOGY | Facility: CLINIC | Age: 24
End: 2024-11-25
Attending: PHYSICIAN ASSISTANT
Payer: COMMERCIAL

## 2024-11-25 VITALS
SYSTOLIC BLOOD PRESSURE: 151 MMHG | TEMPERATURE: 98.4 F | HEART RATE: 69 BPM | WEIGHT: 193.78 LBS | BODY MASS INDEX: 29.37 KG/M2 | DIASTOLIC BLOOD PRESSURE: 96 MMHG | RESPIRATION RATE: 18 BRPM | OXYGEN SATURATION: 100 % | HEIGHT: 68 IN

## 2024-11-25 DIAGNOSIS — J06.9 UPPER RESPIRATORY TRACT INFECTION, UNSPECIFIED TYPE: ICD-10-CM

## 2024-11-25 DIAGNOSIS — R03.0 ELEVATED BLOOD PRESSURE READING: ICD-10-CM

## 2024-11-25 LAB
FLUAV RNA SPEC QL NAA+PROBE: NEGATIVE
FLUBV RNA RESP QL NAA+PROBE: NEGATIVE
GROUP A STREP BY PCR: NOT DETECTED
RSV RNA SPEC NAA+PROBE: NEGATIVE
SARS-COV-2 RNA RESP QL NAA+PROBE: NEGATIVE

## 2024-11-25 PROCEDURE — 99284 EMERGENCY DEPT VISIT MOD MDM: CPT | Mod: 25

## 2024-11-25 PROCEDURE — 87651 STREP A DNA AMP PROBE: CPT | Performed by: PHYSICIAN ASSISTANT

## 2024-11-25 PROCEDURE — 87637 SARSCOV2&INF A&B&RSV AMP PRB: CPT | Performed by: PHYSICIAN ASSISTANT

## 2024-11-25 PROCEDURE — 71046 X-RAY EXAM CHEST 2 VIEWS: CPT

## 2024-11-25 RX ORDER — BENZONATATE 100 MG/1
100 CAPSULE ORAL 3 TIMES DAILY PRN
Qty: 21 CAPSULE | Refills: 0 | Status: SHIPPED | OUTPATIENT
Start: 2024-11-25

## 2024-11-25 ASSESSMENT — COLUMBIA-SUICIDE SEVERITY RATING SCALE - C-SSRS
6. HAVE YOU EVER DONE ANYTHING, STARTED TO DO ANYTHING, OR PREPARED TO DO ANYTHING TO END YOUR LIFE?: NO
1. IN THE PAST MONTH, HAVE YOU WISHED YOU WERE DEAD OR WISHED YOU COULD GO TO SLEEP AND NOT WAKE UP?: NO
2. HAVE YOU ACTUALLY HAD ANY THOUGHTS OF KILLING YOURSELF IN THE PAST MONTH?: NO

## 2024-11-25 ASSESSMENT — ACTIVITIES OF DAILY LIVING (ADL)
ADLS_ACUITY_SCORE: 41
ADLS_ACUITY_SCORE: 41

## 2024-11-25 NOTE — ED PROVIDER NOTES
"  Emergency Department Note      History of Present Illness     Chief Complaint   Cough    HPI   Graeme Christian is a 24 year old male who presents with cough with associated brown/black productive sputum. He developed a sore throat along with a cough two days ago. His sore throat has resolved. No fever, hemoptysis, shortness of breath, or wheezing. He is a smoker. No personal history of blood clots. He is not on hormones. No history of cancer.     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    Surgical History   Bone cyst excision  Hernia repair, right X3    Physical Exam     Patient Vitals for the past 24 hrs:   BP Temp Pulse Resp SpO2 Height Weight   11/25/24 1309 (!) 151/96 -- 69 18 100 % -- --   11/25/24 1132 -- -- -- 20 99 % -- --   11/25/24 1131 (!) 128/90 -- 52 -- -- -- --   11/25/24 1041 (!) 143/104 98.4  F (36.9  C) 70 18 99 % 1.727 m (5' 8\") 87.9 kg (193 lb 12.6 oz)     Physical Exam  Vitals and nursing note reviewed.   Constitutional:       Appearance: He is not diaphoretic.   HENT:      Right Ear: Tympanic membrane, ear canal and external ear normal.      Left Ear: Tympanic membrane and external ear normal.      Nose: Nose normal.      Mouth/Throat:      Mouth: Mucous membranes are moist.      Pharynx: Oropharynx is clear. No oropharyngeal exudate or posterior oropharyngeal erythema.   Eyes:      General: No scleral icterus.     Conjunctiva/sclera: Conjunctivae normal.   Cardiovascular:      Rate and Rhythm: Normal rate and regular rhythm.      Heart sounds: No murmur heard.     No friction rub. No gallop.   Pulmonary:      Effort: Pulmonary effort is normal. No tachypnea or respiratory distress.      Breath sounds: Rhonchi (slight " interittant) present. No wheezing or rales.   Neurological:      Mental Status: He is alert and oriented to person, place, and time. Mental status is at baseline.   Psychiatric:         Mood and Affect: Mood normal.         Behavior: Behavior normal.         Thought Content: Thought content normal.         Diagnostics     Lab Results   Labs Ordered and Resulted from Time of ED Arrival to Time of ED Departure   INFLUENZA A/B, RSV AND SARS-COV2 PCR - Normal       Result Value    Influenza A PCR Negative      Influenza B PCR Negative      RSV PCR Negative      SARS CoV2 PCR Negative     GROUP A STREPTOCOCCUS PCR THROAT SWAB - Normal    Group A strep by PCR Not Detected         Imaging   Chest XR,  PA & LAT   Final Result   IMPRESSION: Negative chest. Lungs clear.      DEXTER STACY MD            SYSTEM ID:  [id number]              Independent Interpretation   CXR: No pneumothorax, infiltrate, or pleural effusion.    ED Course      Medications Administered   Medications - No data to display    Procedures   Procedures     Discussion of Management   None    ED Course   ED Course as of 11/25/24 1642   Mon Nov 25, 2024   1137 I obtained the patient's history and examined as noted above.        Additional Documentation  None    Medical Decision Making / Diagnosis     CMS Diagnoses: None    MIPS       None    MDM   Graeme Christian is a 24 year old male presents with acute onset of cough that is productive.  Chest x-ray obtained showing no evidence of pneumonia.  Patient's vital signs show no hypoxia tachycardia or fever.  Patient is mildly hypertensive.  He does not appear to be in respiratory distress.  I recommend treating covering for potential underlying pneumonia versus bronchitis and also provide him with Tessalon Perles.  Recommending follow-up closely with your primary doctor than the week if his symptoms are persisting if he develops any worsening symptoms difficulty breathing worsening condition to present back to  the ER.    Disposition   The patient was discharged.     Diagnosis     ICD-10-CM    1. Upper respiratory tract infection, unspecified type  J06.9       2. Elevated blood pressure reading  R03.0            Discharge Medications   Discharge Medication List as of 11/25/2024  1:13 PM        START taking these medications    Details   amoxicillin-clavulanate (AUGMENTIN) 875-125 MG tablet Take 1 tablet by mouth 2 times daily for 7 days., Disp-14 tablet, R-0, Local Print      benzonatate (TESSALON) 100 MG capsule Take 1 capsule (100 mg) by mouth 3 times daily as needed for cough., Disp-21 capsule, R-0, Local Print           Scribe Disclosure:  I, Katt Lieberman, am serving as a scribe at 11:28 AM on 11/25/2024 to document services personally performed by Connor Sanders, ALEXANDER based on my observations and the provider's statements to me.           Connor Sanders PA-C  11/25/24 1813

## 2024-11-25 NOTE — ED TRIAGE NOTES
Presents with cough, sore throat and dark brown/black productive sputum, started on Saturday. Smokes marijuana daily, smokes the plant with a bong or a vape pen. Reports mild SOB which he relates to his anxiety.    Triage Assessment (Adult)       Row Name 11/25/24 1041          Triage Assessment    Airway WDL WDL        Respiratory WDL    Respiratory WDL X;cough     Cough Frequency infrequent     Cough Type productive        Skin Circulation/Temperature WDL    Skin Circulation/Temperature WDL WDL        Cardiac WDL    Cardiac WDL WDL        Peripheral/Neurovascular WDL    Peripheral Neurovascular WDL WDL        Cognitive/Neuro/Behavioral WDL    Cognitive/Neuro/Behavioral WDL WDL

## 2024-11-26 ENCOUNTER — PATIENT OUTREACH (OUTPATIENT)
Dept: FAMILY MEDICINE | Facility: CLINIC | Age: 24
End: 2024-11-26
Payer: COMMERCIAL

## 2024-12-05 ENCOUNTER — ORDERS ONLY (AUTO-RELEASED) (OUTPATIENT)
Dept: CARDIOLOGY | Facility: CLINIC | Age: 24
End: 2024-12-05
Payer: COMMERCIAL

## 2024-12-05 DIAGNOSIS — Z82.49 FH: CAD (CORONARY ARTERY DISEASE): ICD-10-CM

## 2024-12-05 DIAGNOSIS — I51.7 LEFT VENTRICULAR HYPERTROPHY: ICD-10-CM

## 2024-12-05 DIAGNOSIS — Z82.49 FAMILY HISTORY OF HYPERTROPHIC CARDIOMYOPATHY: ICD-10-CM

## 2025-01-21 NOTE — TELEPHONE ENCOUNTER
Action    Action Taken Pending PUSH from Flo Holland, [facility], [facility].

## 2025-04-11 NOTE — PROGRESS NOTES
Chief complaint: Evaluation for HCM    HPI:   Graeme Christian is a 24 year old male with no significant pmhx who presents to clinic for evaluation of possible HCM.    Patient presents to clinic as a new patient for evaluation of HCM. He reports that his family history includes his father who has a diagnosis of HCM at the age of 63. His father has associated palpitations. No syncope to his knowledge. Unclear if other family members have this condition. No SCD history in the family.     Today, the patent presents alone. The patient feels well and denies any cardiopulmonary symptoms. He feels he is rather active and without limitations. No palpitations. He notes that he passed out once while waiting in a line. He wonders if it was related to weight loss for [employer] training. No syncope since, no lightheadedness. No orthopnea, PND, leg swelling.     Prior imaging includes a TTE on 8/30/24 which had normal biventricular function, mild LV thickening (maximal septal 1.3 cm), and no ASYA/LVOTO. CMR with normal biventricular function, maximal thickness 1.0 cm, no LGE.     PAST MEDICAL HISTORY:  No past medical history on file.    CURRENT MEDICATIONS:  Current Outpatient Medications   Medication Sig Dispense Refill    albuterol (PROAIR HFA/PROVENTIL HFA/VENTOLIN HFA) 108 (90 Base) MCG/ACT inhaler Inhale 2 puffs into the lungs.      benzonatate (TESSALON) 100 MG capsule Take 1 capsule (100 mg) by mouth 3 times daily as needed for cough. 21 capsule 0    hydrOXYzine chris (VISTARIL) 25 MG capsule Take 1 capsule (25 mg) by mouth 3 times daily as needed for anxiety. 60 capsule 1     ALLERGIES:   No Known Allergies    FAMILY HISTORY:  Family History   Problem Relation Age of Onset    No Known Problems Mother     Hypertension Father     No Known Problems Sister      No family history of premature CAD or sudden death.    SOCIAL HISTORY:  Social History     Tobacco Use    Smoking status: Never    Smokeless tobacco: Never   Vaping Use     Vaping status: Never Used   Substance Use Topics    Drug use: Yes     Types: Marijuana     ROS:   A comprehensive 14 point review of systems is negative other than as mentioned in HPI.    Exam:  /81 (BP Location: Left arm, Patient Position: Chair, Cuff Size: Adult Large)   Pulse 58   Wt 93.6 kg (206 lb 6.4 oz)   SpO2 98%   BMI 31.38 kg/m    GENERAL APPEARANCE: healthy, alert and no distress  EYES: no icterus, no xanthelasmas  ENT: normal palate, mucosa moist, no central cyanosis  NECK: JVP not elevated  RESPIRATORY: lungs clear to auscultation - no rales, rhonchi or wheezes, no use of accessory muscles, no retractions, respirations are unlabored, normal respiratory rate  CARDIOVASCULAR: regular rhythm, normal S1 with physiologic split S2, no S3 or S4 and no murmur, click or rub.  GI: soft, non tender, bowel sounds normal,no abdominal bruits  EXTREMITIES: no edema, no bruits  NEURO: alert and oriented to person/place/time, normal speech, gait and affect  VASC: Radial, dorsalis pedis and posterior tibialis pulses 2+ bilaterally.  SKIN: no ecchymoses, no rashes.  PSYCH: cooperative, affect appropriate.     Labs:  Reviewed.     Testing/Procedures:    I personally visualized and interpreted:  CMR 4/15/25:  Normal biventricular function, maximal thickness 1.0 cm, no LGE. No LVOTO or ASYA    TTE 8/30/24: normal biventricular function, mild LV thickening (maximal septal 1.3 cm), and no ASYA/LVOTO.     Assessment and Plan:   Graeme Christian is a 24 year old male with no significant pmhx who presents to clinic for evaluation of possible HCM.    Patient at this time does not have any phenotypic feature of HCM. CMR demonstrates normal LV wall thickness of 1.0. He has no LGE, apical aneurysm. He has not had LVOTO in his past imaging. He does not have any functional limitations. He has one episode of syncope in the past, but may have been unrelated to any heart condition. I emphasized that he needs to report if he has more  of these episodes into the future. He has no palpitations suggestive of arhythmia.     As above, his father has been diagnosed with HCM. This diagnosis seems to have been made with TTE, and it is unclear if other testing has taken place, including genetic screening. History is limit on his father's heart condition, but there has no red flag symptoms at this time. If he has not yet had, it would be helpful to know if his father had genetic screening and the results, as it would inform Mr. Christian's risk. Mr. Christian also has a sister; it is unclear if she has had clinical screening at this time    Plan in Brief:  - No present evidence of HCM, but will require ongoing clinical screening.   - Discuss genetic screening with father and obtain results, if possible. If the father has a genetic abnormality associated with HCM, we would recommend cascade screening for Mr. Christian.   - Follow up in 2 years with DARLENEEtony    The patient states understanding and is agreeable with plan.     Discussed with Dr. Suarez.    Gildardo Zacarias, PGY-6  Cardiovascular Disease Fellow    [facility]

## 2025-04-14 ENCOUNTER — TELEPHONE (OUTPATIENT)
Dept: CARDIOLOGY | Facility: CLINIC | Age: 25
End: 2025-04-14
Payer: COMMERCIAL

## 2025-04-14 NOTE — PROGRESS NOTES
Physician Attestation   I, Aime Suarez MD, saw this patient and agree with the findings and plan of care as documented in the note.      Items personally reviewed/procedural attestation: vitals, labs, imaging and agree with the interpretation documented in the note, and EKG and agree with the interpretation documented in the note.    I personally visualized and interpreted:  TTE 8/30/24: Borderline increased LV wall thickness (max thickness ~1.3 cm basal anteroseptum.) Normal LV/RV size/function, LVEF=60-65%. No ASYA and no detectable LVOT gradient at rest.     CMR  4/15/25: Normal LV/RV size/function, LVEF=60% RVEF=69%. No phenotypic evidence of hypertrophic cardiomyopathy (max thickness 11 mm.) Apically-displaced papillary muscles without any other additional ancillary features of HCM. No fibrosis on late gadolinium enhancement imaging.       The longitudinal plan of care for the diagnosis(es)/condition(s) as documented were addressed during this visit. Due to the added complexity in care, I will continue to support Graeme in the subsequent management and with ongoing continuity of care.  Aime Suarez MD

## 2025-04-15 ENCOUNTER — OFFICE VISIT (OUTPATIENT)
Dept: CARDIOLOGY | Facility: CLINIC | Age: 25
End: 2025-04-15
Attending: PHYSICIAN ASSISTANT
Payer: COMMERCIAL

## 2025-04-15 ENCOUNTER — PRE VISIT (OUTPATIENT)
Dept: CARDIOLOGY | Facility: CLINIC | Age: 25
End: 2025-04-15

## 2025-04-15 ENCOUNTER — LAB (OUTPATIENT)
Dept: LAB | Facility: CLINIC | Age: 25
End: 2025-04-15
Attending: INTERNAL MEDICINE
Payer: COMMERCIAL

## 2025-04-15 ENCOUNTER — HOSPITAL ENCOUNTER (OUTPATIENT)
Dept: MRI IMAGING | Facility: CLINIC | Age: 25
Discharge: HOME | End: 2025-04-15
Attending: INTERNAL MEDICINE
Payer: COMMERCIAL

## 2025-04-15 VITALS
WEIGHT: 206.4 LBS | OXYGEN SATURATION: 98 % | DIASTOLIC BLOOD PRESSURE: 81 MMHG | HEART RATE: 58 BPM | SYSTOLIC BLOOD PRESSURE: 138 MMHG | BODY MASS INDEX: 31.38 KG/M2

## 2025-04-15 DIAGNOSIS — Z82.49 FH: CAD (CORONARY ARTERY DISEASE): ICD-10-CM

## 2025-04-15 DIAGNOSIS — Z82.49 FAMILY HISTORY OF HYPERTROPHIC CARDIOMYOPATHY: ICD-10-CM

## 2025-04-15 DIAGNOSIS — I51.7 LEFT VENTRICULAR HYPERTROPHY: ICD-10-CM

## 2025-04-15 LAB
ALBUMIN SERPL BCG-MCNC: 4.5 G/DL (ref 3.5–5.2)
ALP SERPL-CCNC: 50 U/L (ref 40–150)
ALT SERPL W P-5'-P-CCNC: 27 U/L (ref 0–70)
ANION GAP SERPL CALCULATED.3IONS-SCNC: 11 MMOL/L (ref 7–15)
AST SERPL W P-5'-P-CCNC: 29 U/L (ref 0–45)
BASOPHILS # BLD AUTO: 0.1 10E3/UL (ref 0–0.2)
BASOPHILS NFR BLD AUTO: 1 %
BILIRUB SERPL-MCNC: 1.4 MG/DL
BUN SERPL-MCNC: 12.3 MG/DL (ref 6–20)
CALCIUM SERPL-MCNC: 10.3 MG/DL (ref 8.8–10.4)
CHLORIDE SERPL-SCNC: 103 MMOL/L (ref 98–107)
CREAT SERPL-MCNC: 0.94 MG/DL (ref 0.67–1.17)
EGFRCR SERPLBLD CKD-EPI 2021: >90 ML/MIN/1.73M2
EOSINOPHIL # BLD AUTO: 0.6 10E3/UL (ref 0–0.7)
EOSINOPHIL NFR BLD AUTO: 7 %
ERYTHROCYTE [DISTWIDTH] IN BLOOD BY AUTOMATED COUNT: 12.6 % (ref 10–15)
GLUCOSE SERPL-MCNC: 111 MG/DL (ref 70–99)
HCO3 SERPL-SCNC: 23 MMOL/L (ref 22–29)
HCT VFR BLD AUTO: 46.5 % (ref 40–53)
HGB BLD-MCNC: 15.6 G/DL (ref 13.3–17.7)
IMM GRANULOCYTES # BLD: 0 10E3/UL
IMM GRANULOCYTES NFR BLD: 0 %
LYMPHOCYTES # BLD AUTO: 2 10E3/UL (ref 0.8–5.3)
LYMPHOCYTES NFR BLD AUTO: 26 %
MCH RBC QN AUTO: 29.2 PG (ref 26.5–33)
MCHC RBC AUTO-ENTMCNC: 33.5 G/DL (ref 31.5–36.5)
MCV RBC AUTO: 87 FL (ref 78–100)
MONOCYTES # BLD AUTO: 0.7 10E3/UL (ref 0–1.3)
MONOCYTES NFR BLD AUTO: 9 %
NEUTROPHILS # BLD AUTO: 4.5 10E3/UL (ref 1.6–8.3)
NEUTROPHILS NFR BLD AUTO: 57 %
NRBC # BLD AUTO: 0 10E3/UL
NRBC BLD AUTO-RTO: 0 /100
PLATELET # BLD AUTO: 222 10E3/UL (ref 150–450)
POTASSIUM SERPL-SCNC: 4.3 MMOL/L (ref 3.4–5.3)
PROT SERPL-MCNC: 7.1 G/DL (ref 6.4–8.3)
RBC # BLD AUTO: 5.34 10E6/UL (ref 4.4–5.9)
SODIUM SERPL-SCNC: 137 MMOL/L (ref 135–145)
WBC # BLD AUTO: 7.9 10E3/UL (ref 4–11)

## 2025-04-15 PROCEDURE — 3075F SYST BP GE 130 - 139MM HG: CPT | Performed by: INTERNAL MEDICINE

## 2025-04-15 PROCEDURE — A9585 GADOBUTROL INJECTION: HCPCS | Performed by: INTERNAL MEDICINE

## 2025-04-15 PROCEDURE — 85025 COMPLETE CBC W/AUTO DIFF WBC: CPT | Performed by: INTERNAL MEDICINE

## 2025-04-15 PROCEDURE — 75561 CARDIAC MRI FOR MORPH W/DYE: CPT

## 2025-04-15 PROCEDURE — 255N000002 HC RX 255 OP 636: Performed by: INTERNAL MEDICINE

## 2025-04-15 PROCEDURE — 99204 OFFICE O/P NEW MOD 45 MIN: CPT | Mod: 25 | Performed by: INTERNAL MEDICINE

## 2025-04-15 PROCEDURE — 99213 OFFICE O/P EST LOW 20 MIN: CPT | Performed by: INTERNAL MEDICINE

## 2025-04-15 PROCEDURE — 75561 CARDIAC MRI FOR MORPH W/DYE: CPT | Mod: 26 | Performed by: STUDENT IN AN ORGANIZED HEALTH CARE EDUCATION/TRAINING PROGRAM

## 2025-04-15 PROCEDURE — 93005 ELECTROCARDIOGRAM TRACING: CPT

## 2025-04-15 PROCEDURE — 3079F DIAST BP 80-89 MM HG: CPT | Performed by: INTERNAL MEDICINE

## 2025-04-15 PROCEDURE — 80053 COMPREHEN METABOLIC PANEL: CPT | Performed by: INTERNAL MEDICINE

## 2025-04-15 PROCEDURE — 1126F AMNT PAIN NOTED NONE PRSNT: CPT | Performed by: INTERNAL MEDICINE

## 2025-04-15 RX ORDER — GADOBUTROL 604.72 MG/ML
10 INJECTION INTRAVENOUS ONCE
Status: COMPLETED | OUTPATIENT
Start: 2025-04-15 | End: 2025-04-15

## 2025-04-15 RX ADMIN — GADOBUTROL 10 ML: 604.72 INJECTION INTRAVENOUS at 07:21

## 2025-04-15 ASSESSMENT — PAIN SCALES - GENERAL: PAINLEVEL_OUTOF10: NO PAIN (0)

## 2025-04-15 NOTE — PATIENT INSTRUCTIONS
"  Thank you for visiting the Adult Congenital and Cardiovascular Genetics Clinic at the [facility].    Cardiology Providers you saw during your visit:  Aime Suarez MD    Diagnosis:  HCM    Results:  Aime Suarez MD reviewed the results of your cardiac MRI, lab, and echocardiogram testing today in clinic.    Recommendations for you:    No changes today.  Please let us know if you would like to arrange for genetic counseling and testing.       General Cardiac Recommendations:  Continue to eat a heart healthy, low salt diet.  Continue to get 20-30 minutes of aerobic activity, 4-5 days per week.  Examples of aerobic activity include walking, running, swimming, cycling, etc.  Continue to observe good oral hygiene, with regular dental visits.      SBE prophylaxis:   Yes____  No__x__    Exercise restrictions:   Yes__X__  No____         If yes, list restrictions:  Must be allowed to rest if fatigued or SOB      FASTING CHOLESTEROL was checked in the last 5 years YES____  NO_x___ (none)  If no, please follow up with your primary care physician. You should have a cholesterol screening every 5 years.      Follow-up:  Follow up with Dr. Suarez in 2 years with echo and zio patch prior.     If you have questions or concerns please contact us at:    Bharathi Fuentes RN, BSN     Yisel Moreno (Scheduling)  Nurse Care Coordinator     Clinic   CV Genetics      Adult Congenital and CV Genetic  [facility]   [facility]  (P) [phone]     (P) [phone]  (F) [phone]     (F) [phone]        For after hours urgent needs, call [phone] and ask to speak to the \"On-Call Cardiologist.\"      For emergencies call 104.    [facility]  [facility]  Mail Code [id number]  [address] [facility] [address], [place]  [address]   "

## 2025-04-15 NOTE — LETTER
4/15/2025      RE: Graeme Christian  [address] [place] [facility] [address]       Dear Colleague,    Thank you for the opportunity to participate in the care of your patient, Graeme Christian, at the [facility] [place] [facility]. Please see a copy of my visit note below.    Chief complaint: Evaluation for HCM    HPI:   Graeme Christian is a 24 year old male with no significant pmhx who presents to clinic for evaluation of possible HCM.    Patient presents to clinic as a new patient for evaluation of HCM. He reports that his family history includes his father who has a diagnosis of HCM at the age of 63. His father has associated palpitations. No syncope to his knowledge. Unclear if other family members have this condition. No SCD history in the family.     Today, the patent presents alone. The patient feels well and denies any cardiopulmonary symptoms. He feels he is rather active and without limitations. No palpitations. He notes that he passed out once while waiting in a line. He wonders if it was related to weight loss for [employer] training. No syncope since, no lightheadedness. No orthopnea, PND, leg swelling.     Prior imaging includes a TTE on 8/30/24 which had normal biventricular function, mild LV thickening (maximal septal 1.3 cm), and no ASYA/LVOTO. CMR with normal biventricular function, maximal thickness 1.0 cm, no LGE.     PAST MEDICAL HISTORY:  No past medical history on file.    CURRENT MEDICATIONS:  Current Outpatient Medications   Medication Sig Dispense Refill     albuterol (PROAIR HFA/PROVENTIL HFA/VENTOLIN HFA) 108 (90 Base) MCG/ACT inhaler Inhale 2 puffs into the lungs.       benzonatate (TESSALON) 100 MG capsule Take 1 capsule (100 mg) by mouth 3 times daily as needed for cough. 21 capsule 0     hydrOXYzine chris (VISTARIL) 25 MG capsule Take 1 capsule (25 mg) by mouth 3 times daily as needed for anxiety. 60 capsule 1     ALLERGIES:   No  Known Allergies    FAMILY HISTORY:  Family History   Problem Relation Age of Onset     No Known Problems Mother      Hypertension Father      No Known Problems Sister      No family history of premature CAD or sudden death.    SOCIAL HISTORY:  Social History     Tobacco Use     Smoking status: Never     Smokeless tobacco: Never   Vaping Use     Vaping status: Never Used   Substance Use Topics     Drug use: Yes     Types: Marijuana     ROS:   A comprehensive 14 point review of systems is negative other than as mentioned in HPI.    Exam:  /81 (BP Location: Left arm, Patient Position: Chair, Cuff Size: Adult Large)   Pulse 58   Wt 93.6 kg (206 lb 6.4 oz)   SpO2 98%   BMI 31.38 kg/m    GENERAL APPEARANCE: healthy, alert and no distress  EYES: no icterus, no xanthelasmas  ENT: normal palate, mucosa moist, no central cyanosis  NECK: JVP not elevated  RESPIRATORY: lungs clear to auscultation - no rales, rhonchi or wheezes, no use of accessory muscles, no retractions, respirations are unlabored, normal respiratory rate  CARDIOVASCULAR: regular rhythm, normal S1 with physiologic split S2, no S3 or S4 and no murmur, click or rub.  GI: soft, non tender, bowel sounds normal,no abdominal bruits  EXTREMITIES: no edema, no bruits  NEURO: alert and oriented to person/place/time, normal speech, gait and affect  VASC: Radial, dorsalis pedis and posterior tibialis pulses 2+ bilaterally.  SKIN: no ecchymoses, no rashes.  PSYCH: cooperative, affect appropriate.     Labs:  Reviewed.     Testing/Procedures:    I personally visualized and interpreted:  CMR 4/15/25:  Normal biventricular function, maximal thickness 1.0 cm, no LGE. No LVOTO or ASYA    TTE 8/30/24: normal biventricular function, mild LV thickening (maximal septal 1.3 cm), and no ASYA/LVOTO.     Assessment and Plan:   Graeme Christian is a 24 year old male with no significant pmhx who presents to clinic for evaluation of possible HCM.    Patient at this time does not have  any phenotypic feature of HCM. CMR demonstrates normal LV wall thickness of 1.0. He has no LGE, apical aneurysm. He has not had LVOTO in his past imaging. He does not have any functional limitations. He has one episode of syncope in the past, but may have been unrelated to any heart condition. I emphasized that he needs to report if he has more of these episodes into the future. He has no palpitations suggestive of arhythmia.     As above, his father has been diagnosed with HCM. This diagnosis seems to have been made with TTE, and it is unclear if other testing has taken place, including genetic screening. History is limit on his father's heart condition, but there has no red flag symptoms at this time. If he has not yet had, it would be helpful to know if his father had genetic screening and the results, as it would inform Mr. Christian's risk. Mr. Christian also has a sister; it is unclear if she has had clinical screening at this time    Plan in Brief:  - No present evidence of HCM, but will require ongoing clinical screening.   - Discuss genetic screening with father and obtain results, if possible. If the father has a genetic abnormality associated with HCM, we would recommend cascade screening for Mr. Christian.   - Follow up in 2 years with TTE, tony    The patient states understanding and is agreeable with plan.     Discussed with Dr. Suarez.    Gildardo Zacarias, PGY-6  Cardiovascular Disease Fellow    [facility]      Physician Attestation  I, Aime Suarez MD, saw this patient and agree with the findings and plan of care as documented in the note.      Items personally reviewed/procedural attestation: vitals, labs, imaging and agree with the interpretation documented in the note, and EKG and agree with the interpretation documented in the note.    I personally visualized and interpreted:  TTE 8/30/24: Borderline increased LV wall thickness (max thickness ~1.3 cm basal anteroseptum.) Normal LV/RV  size/function, LVEF=60-65%. No ASYA and no detectable LVOT gradient at rest.     CMR  4/15/25: Normal LV/RV size/function, LVEF=60% RVEF=69%. No phenotypic evidence of hypertrophic cardiomyopathy (max thickness 11 mm.) Apically-displaced papillary muscles without any other additional ancillary features of HCM. No fibrosis on late gadolinium enhancement imaging.       The longitudinal plan of care for the diagnosis(es)/condition(s) as documented were addressed during this visit. Due to the added complexity in care, I will continue to support Graeme in the subsequent management and with ongoing continuity of care.  Aime Suarez MD      Please do not hesitate to contact me if you have any questions/concerns.     Sincerely,     Aime Suarez MD

## 2025-04-15 NOTE — NURSING NOTE
Chief Complaint   Patient presents with    New Patient     [facility] Visit Type: CV Genetics ([facility]) 4/15/1987: 24 year old male with family history of HCM presenting for evaluation.         Vitals were taken, medications reconciled and EKG performed.    Connor Corrales, Clinic Assistant     9:15 AM

## 2025-04-16 LAB
ATRIAL RATE - MUSE: 56 BPM
DIASTOLIC BLOOD PRESSURE - MUSE: NORMAL MMHG
INTERPRETATION ECG - MUSE: NORMAL
P AXIS - MUSE: 37 DEGREES
PR INTERVAL - MUSE: 190 MS
QRS DURATION - MUSE: 94 MS
QT - MUSE: 396 MS
QTC - MUSE: 382 MS
R AXIS - MUSE: 71 DEGREES
SYSTOLIC BLOOD PRESSURE - MUSE: NORMAL MMHG
T AXIS - MUSE: 26 DEGREES
VENTRICULAR RATE- MUSE: 56 BPM

## 2025-08-10 ENCOUNTER — HEALTH MAINTENANCE LETTER (OUTPATIENT)
Age: 25
End: 2025-08-10

## (undated) DEVICE — VIOLET BRAIDED (POLYGLACTIN 910), SYNTHETIC ABSORBABLE SUTURE: Brand: COATED VICRYL

## (undated) DEVICE — SUT VICRYL 5-0 PC-1 J834G

## (undated) DEVICE — SUT ETHIBOND 1 CT-1 X425H

## (undated) DEVICE — MINI LAP PACK-LF: Brand: MEDLINE INDUSTRIES, INC.

## (undated) DEVICE — SOL  .9 1000ML BTL

## (undated) DEVICE — 3M(TM) MICROPORE TAPE DISPENSER 1535-2: Brand: 3M™ MICROPORE™

## (undated) DEVICE — CHLORAPREP 26ML APPLICATOR

## (undated) DEVICE — STERILE POLYISOPRENE POWDER-FREE SURGICAL GLOVES: Brand: PROTEXIS

## (undated) DEVICE — SOLUTION  .9 1000ML BTL

## (undated) DEVICE — GAUZE SPONGES,USP TYPE VII GAUZE, 12 PLY: Brand: CURITY

## (undated) DEVICE — KENDALL SCD EXPRESS SLEEVES, KNEE LENGTH, MEDIUM: Brand: KENDALL SCD

## (undated) DEVICE — SLEEVE KENDALL SCD EXPRESS MED

## (undated) DEVICE — SUTURE VICRYL 2-0 SH

## (undated) DEVICE — SUTURE VICRYL 3-0 SH

## (undated) DEVICE — SUTURE VICRYL 5-0 PC-1

## (undated) NOTE — LETTER
2018        Name: Cyn Rosa        : 2000    To Whom It May Concern,    Wolf Dee had surgery on 3/7/18 and is fit for [employer] duty.          Comments:    If there are any further questions regarding this patient, please do not hesitate to

## (undated) NOTE — ED AVS SNAPSHOT
Kristen Cleary   MRN: [id number]    Department:  [facility] Emergency Department   Date of Visit:  5/22/2018           Disclosure     Insurance plans vary and the physician(s) referred by the ER may not be covered by your plan.  Please contact your in tell this physician (or your personal doctor if your instructions are to return to your personal doctor) about any new or lasting problems. The primary care or specialist physician will see patients referred from the [facility] Emergency Department.  Daniel Jordan